# Patient Record
Sex: MALE | Race: WHITE
[De-identification: names, ages, dates, MRNs, and addresses within clinical notes are randomized per-mention and may not be internally consistent; named-entity substitution may affect disease eponyms.]

---

## 2018-08-21 ENCOUNTER — HOSPITAL ENCOUNTER (INPATIENT)
Dept: HOSPITAL 97 - ER | Age: 68
LOS: 3 days | Discharge: HOME | DRG: 683 | End: 2018-08-24
Attending: FAMILY MEDICINE | Admitting: INTERNAL MEDICINE
Payer: COMMERCIAL

## 2018-08-21 DIAGNOSIS — E44.0: ICD-10-CM

## 2018-08-21 DIAGNOSIS — E87.4: ICD-10-CM

## 2018-08-21 DIAGNOSIS — D72.829: ICD-10-CM

## 2018-08-21 DIAGNOSIS — E78.5: ICD-10-CM

## 2018-08-21 DIAGNOSIS — T39.315A: ICD-10-CM

## 2018-08-21 DIAGNOSIS — E83.42: ICD-10-CM

## 2018-08-21 DIAGNOSIS — I24.8: ICD-10-CM

## 2018-08-21 DIAGNOSIS — I35.0: ICD-10-CM

## 2018-08-21 DIAGNOSIS — M62.82: ICD-10-CM

## 2018-08-21 DIAGNOSIS — M19.90: ICD-10-CM

## 2018-08-21 DIAGNOSIS — E87.6: ICD-10-CM

## 2018-08-21 DIAGNOSIS — R01.1: ICD-10-CM

## 2018-08-21 DIAGNOSIS — B95.2: ICD-10-CM

## 2018-08-21 DIAGNOSIS — E83.39: ICD-10-CM

## 2018-08-21 DIAGNOSIS — Z88.0: ICD-10-CM

## 2018-08-21 DIAGNOSIS — Z79.1: ICD-10-CM

## 2018-08-21 DIAGNOSIS — Z88.2: ICD-10-CM

## 2018-08-21 DIAGNOSIS — N17.9: Primary | ICD-10-CM

## 2018-08-21 DIAGNOSIS — E66.01: ICD-10-CM

## 2018-08-21 DIAGNOSIS — N39.0: ICD-10-CM

## 2018-08-21 DIAGNOSIS — E86.0: ICD-10-CM

## 2018-08-21 LAB
ALBUMIN SERPL BCP-MCNC: 2.8 G/DL (ref 3.4–5)
ALP SERPL-CCNC: 145 U/L (ref 45–117)
ALT SERPL W P-5'-P-CCNC: 57 U/L (ref 12–78)
AST SERPL W P-5'-P-CCNC: 103 U/L (ref 15–37)
BUN BLD-MCNC: 63 MG/DL (ref 7–18)
CKMB CREATINE KINASE MB: 4.2 NG/ML (ref 0.3–3.6)
GLUCOSE SERPLBLD-MCNC: 151 MG/DL (ref 74–106)
HCT VFR BLD CALC: 36.7 % (ref 39.6–49)
INR BLD: 1.14
LIPASE SERPL-CCNC: 135 U/L (ref 73–393)
LYMPHOCYTES # SPEC AUTO: 1.9 K/UL (ref 0.7–4.9)
MAGNESIUM SERPL-MCNC: 2.1 MG/DL (ref 1.8–2.4)
MCH RBC QN AUTO: 30.2 PG (ref 27–35)
MCV RBC: 88.8 FL (ref 80–100)
NT-PROBNP SERPL-MCNC: 1230 PG/ML (ref ?–125)
PMV BLD: 9.1 FL (ref 7.6–11.3)
POTASSIUM SERPL-SCNC: 3.3 MMOL/L (ref 3.5–5.1)
RBC # BLD: 4.13 M/UL (ref 4.33–5.43)

## 2018-08-21 PROCEDURE — 36415 COLL VENOUS BLD VENIPUNCTURE: CPT

## 2018-08-21 PROCEDURE — 84443 ASSAY THYROID STIM HORMONE: CPT

## 2018-08-21 PROCEDURE — 80048 BASIC METABOLIC PNL TOTAL CA: CPT

## 2018-08-21 PROCEDURE — 82962 GLUCOSE BLOOD TEST: CPT

## 2018-08-21 PROCEDURE — 93306 TTE W/DOPPLER COMPLETE: CPT

## 2018-08-21 PROCEDURE — 84156 ASSAY OF PROTEIN URINE: CPT

## 2018-08-21 PROCEDURE — 83880 ASSAY OF NATRIURETIC PEPTIDE: CPT

## 2018-08-21 PROCEDURE — 93005 ELECTROCARDIOGRAM TRACING: CPT

## 2018-08-21 PROCEDURE — 96365 THER/PROPH/DIAG IV INF INIT: CPT

## 2018-08-21 PROCEDURE — 99285 EMERGENCY DEPT VISIT HI MDM: CPT

## 2018-08-21 PROCEDURE — 96361 HYDRATE IV INFUSION ADD-ON: CPT

## 2018-08-21 PROCEDURE — 81003 URINALYSIS AUTO W/O SCOPE: CPT

## 2018-08-21 PROCEDURE — 84145 PROCALCITONIN (PCT): CPT

## 2018-08-21 PROCEDURE — 83735 ASSAY OF MAGNESIUM: CPT

## 2018-08-21 PROCEDURE — 87086 URINE CULTURE/COLONY COUNT: CPT

## 2018-08-21 PROCEDURE — 83036 HEMOGLOBIN GLYCOSYLATED A1C: CPT

## 2018-08-21 PROCEDURE — 80069 RENAL FUNCTION PANEL: CPT

## 2018-08-21 PROCEDURE — 84132 ASSAY OF SERUM POTASSIUM: CPT

## 2018-08-21 PROCEDURE — 80076 HEPATIC FUNCTION PANEL: CPT

## 2018-08-21 PROCEDURE — 87088 URINE BACTERIA CULTURE: CPT

## 2018-08-21 PROCEDURE — 86430 RHEUMATOID FACTOR TEST QUAL: CPT

## 2018-08-21 PROCEDURE — 83605 ASSAY OF LACTIC ACID: CPT

## 2018-08-21 PROCEDURE — 84100 ASSAY OF PHOSPHORUS: CPT

## 2018-08-21 PROCEDURE — 74176 CT ABD & PELVIS W/O CONTRAST: CPT

## 2018-08-21 PROCEDURE — 86225 DNA ANTIBODY NATIVE: CPT

## 2018-08-21 PROCEDURE — 87040 BLOOD CULTURE FOR BACTERIA: CPT

## 2018-08-21 PROCEDURE — 82550 ASSAY OF CK (CPK): CPT

## 2018-08-21 PROCEDURE — 84484 ASSAY OF TROPONIN QUANT: CPT

## 2018-08-21 PROCEDURE — 85025 COMPLETE CBC W/AUTO DIFF WBC: CPT

## 2018-08-21 PROCEDURE — 82570 ASSAY OF URINE CREATININE: CPT

## 2018-08-21 PROCEDURE — 87186 SC STD MICRODIL/AGAR DIL: CPT

## 2018-08-21 PROCEDURE — 76377 3D RENDER W/INTRP POSTPROCES: CPT

## 2018-08-21 PROCEDURE — 96375 TX/PRO/DX INJ NEW DRUG ADDON: CPT

## 2018-08-21 PROCEDURE — 86235 NUCLEAR ANTIGEN ANTIBODY: CPT

## 2018-08-21 PROCEDURE — 85730 THROMBOPLASTIN TIME PARTIAL: CPT

## 2018-08-21 PROCEDURE — 81015 MICROSCOPIC EXAM OF URINE: CPT

## 2018-08-21 PROCEDURE — 80053 COMPREHEN METABOLIC PANEL: CPT

## 2018-08-21 PROCEDURE — 86038 ANTINUCLEAR ANTIBODIES: CPT

## 2018-08-21 PROCEDURE — 71046 X-RAY EXAM CHEST 2 VIEWS: CPT

## 2018-08-21 PROCEDURE — 86160 COMPLEMENT ANTIGEN: CPT

## 2018-08-21 PROCEDURE — 82805 BLOOD GASES W/O2 SATURATION: CPT

## 2018-08-21 PROCEDURE — 82553 CREATINE MB FRACTION: CPT

## 2018-08-21 PROCEDURE — 83690 ASSAY OF LIPASE: CPT

## 2018-08-21 PROCEDURE — 85610 PROTHROMBIN TIME: CPT

## 2018-08-21 PROCEDURE — 84550 ASSAY OF BLOOD/URIC ACID: CPT

## 2018-08-21 PROCEDURE — 71045 X-RAY EXAM CHEST 1 VIEW: CPT

## 2018-08-21 PROCEDURE — 86147 CARDIOLIPIN ANTIBODY EA IG: CPT

## 2018-08-21 PROCEDURE — 83970 ASSAY OF PARATHORMONE: CPT

## 2018-08-21 PROCEDURE — 76770 US EXAM ABDO BACK WALL COMP: CPT

## 2018-08-21 PROCEDURE — 87077 CULTURE AEROBIC IDENTIFY: CPT

## 2018-08-21 PROCEDURE — 96366 THER/PROPH/DIAG IV INF ADDON: CPT

## 2018-08-21 NOTE — ER
Nurse's Notes                                                                                     

 [facility]                                                                

Name: Ashok Simon                                                                             

Age: 68 yrs                                                                                       

Sex: Male                                                                                         

: 1950                                                                                   

MRN: [id number]                                                                                   

Arrival Date: 2018                                                                          

Time: 18:18                                                                                       

Account#: [id number]                                                                            

Bed 6                                                                                             

Private MD: Martir Raya T                                                                        

Diagnosis: Hypotension;Unspecified kidney failure;Hypokalemia;Rhabdomyolysis;Obesity,             

  unspecified;Elevated white blood cell count;Cystitis                                            

                                                                                                  

Presentation:                                                                                     

                                                                                             

18:36 Presenting complaint: Patient states: " I had some blood drawn earlier and they said I  ph  

      needed to come in because my WBC was high and my kidney function was bad." Pt reports       

      diarrhea on Friday, also reports sweating and chills, denies pain, states, " I took my      

      blood pressure this morning and it was normal and then when I took it again it was low      

      (90s/60s) and it hasn't gotten above 100 all day." Pt diaphoretic in triage. Transition     

      of care: patient was not received from another setting of care. Onset of symptoms was       

      2018. Risk Assessment: Do you want to hurt yourself or someone else? Patient     

      reports no desire to harm self or others. Initial Sepsis Screen: Does the patient meet      

      any 2 criteria? Systolic BP < 90 mmHg. HR > 90 bpm. Yes. Care prior to arrival: None.       

18:36 Method Of Arrival: Ambulatory                                                           ph  

18:36 Acuity: CATHY 3                                                                           ph  

21:19 Initial Sepsis Screen: Does the patient have a suspected source of infection? No.       ak1 

      Patient's initial sepsis screen is negative.                                                

                                                                                                  

Historical:                                                                                       

- Allergies:                                                                                      

18:42 PENICILLINS;                                                                            ph  

18:42 Sulfa (Sulfonamide Antibiotics);                                                        ph  

- Home Meds:                                                                                      

18:42 unknown BP med w/ diuretic [Active];                                                    ph  

- PMHx:                                                                                           

18:42 Hypertension;                                                                           ph  

- PSHx:                                                                                           

18:42 Cholecystectomy; Appendectomy;                                                          ph  

                                                                                                  

- Immunization history:: Adult Immunizations unknown.                                             

- Social history:: Smoking status: Patient/guardian denies using tobacco.                         

- Ebola Screening: : No symptoms or risks identified at this time.                                

- Family history:: not pertinent.                                                                 

                                                                                                  

                                                                                                  

Screenin:40 Abuse screen: Denies threats or abuse. Denies injuries from another. Nutritional        hb  

      screening: No deficits noted. Tuberculosis screening: No symptoms or risk factors           

      identified. Fall Risk Total Herron Fall Scale indicates Low Risk Score (25-44 pts). Fall     

      prevention measures have been instituted. Side Rails Up X 2 Frequent Obs/Assesments         

      occuring As available Patient and Family Educated on Fall Prevention Program and            

      strategies.                                                                                 

                                                                                                  

Assessment:                                                                                       

19:01 General: Appears in no apparent distress. ill, Behavior is calm, cooperative. Pain:     hb  

      Denies pain. Neuro: Level of Consciousness is awake, alert, obeys commands, Oriented to     

      person, place, time, situation. Cardiovascular: Heart tones S1 S2 present Capillary         

      refill < 3 seconds. Respiratory: Airway is patent Trachea midline Respiratory effort is     

      even, unlabored, Respiratory pattern is regular, symmetrical, Breath sounds are clear       

      bilaterally. GI: No signs and/or symptoms were reported involving the gastrointestinal      

      system. : No signs and/or symptoms were reported regarding the genitourinary system.      

      EENT: No signs and/or symptoms were reported regarding the EENT system. Derm: Skin is       

      intact, is healthy with good turgor, Skin is diaphoretic, Skin is pale.                     

      Musculoskeletal: No signs and/or symptoms reported regarding the musculoskeletal system.    

20:21 Reassessment: PT ABLE TO AMBULATE TO RESTROOM AND URINATE WITHOUT ISSUE, MD NOTIFIED.   bp  

      OLEARY CANCELLED BY MD. PRE VOID BLADDER SCAN REVEALED NO RETENTION, 150CC IN BLADDER.       

                                                                                                  

Vital Signs:                                                                                      

18:39 BP 98 / 64; Pulse 110; Resp 22; Temp 97.7(TE); Pulse Ox 97% on R/A; Weight 151.5 kg;    ph  

19:58                                                                                         ak1 

20:26 BP 97 / 48; Pulse 103; Resp 25; Pulse Ox 97% ;                                          bp  

21:18  / 56; Pulse 102; Resp 25; Temp 97.7(O); Pulse Ox 99% on R/A; Pain 0/10;          ak1 

19:58 150mL from bladder scan pre-void.                                                       ak1 

                                                                                                  

ED Course:                                                                                        

18:18 Patient arrived in ED.                                                                  mr  

18:18 Martir Raya MD is Private Physician.                                                   mr  

18:29 Gregorio Zeng MD is Attending Physician.                                             dawn 

18:36 Missed attempt(s): 20 gauge in right forearm. by Filiberto HUBER. Bleeding controlled, band   hb  

      aid applied, catheter tip intact.                                                           

18:39 Triage completed.                                                                       ph  

18:40 Patient has correct armband on for positive identification. Placed in gown. Bed in low  hb  

      position. Call light in reach. Side rails up X 1.                                           

18:40 Inserted saline lock: 22 gauge in left antecubital area, using aseptic technique. Blood hb  

      collected.                                                                                  

18:42 Arm band placed on Patient placed in an exam room, on a stretcher, in view of staff     ph  

      members, on cardiac monitor, on pulse oximetry.                                             

18:56 Prabha Teresa, RN is Primary Nurse.                                                     ak1 

18:56 X-ray completed. Portable x-ray completed in exam room. Patient tolerated procedure     mh1 

      well.                                                                                       

18:56 XRAY Chest (1 view) In Process Unspecified.                                             EDMS

19:02 CT Stone Protocol In Process Unspecified.                                               EDMS

19:42 Notified ED physician of a critical lab result(s). CR 5.4, CPK [id number] Dr Karri torres  

      notified.                                                                                   

19:56 Bladder scan completed. 150.                                                            mt  

20:22 Inserted saline lock: 20 gauge in right hand, using aseptic technique.                  bp  

20:25 Yung Garner MD is Hospitalizing Provider.                                          dawn 

21:16 No provider procedures requiring assistance completed. Patient admitted, IV remains in  ak1 

      place.                                                                                      

                                                                                                  

Administered Medications:                                                                         

18:45 Drug: NS 0.9% 500 ml Route: IV; Rate: bolus; Site: left antecubital;                    hb  

19:57 Follow up: IV Status: Completed infusion                                                ak1 

20:20 Drug: NS 0.9% 1000 ml Route: IV; Rate: 125 ml/hr; Site: right hand;                     bp  

22:02 Follow up: IV Status: Completed infusion                                                ak1 

20:20 Drug: Cefepime 2 grams Route: IVPB; Rate: 200 ml/hr; Infused Over: 30 mins; Site: right bp  

      hand;                                                                                       

21:58 Follow up: IV Status: Completed infusion                                                ak1 

20:25 Not Given (Duplicate Order): NS 0.9% 1000 ml IV at 125 ml/hr continuous                 bp  

20:55 Drug: Pepcid 20 mg Route: IVP; Site: right hand;                                        lp1 

21:57 Follow up: Response: No adverse reaction                                                ak1 

20:55 Drug: Aspirin 81 mg Route: PO;                                                          lp1 

21:57 Follow up: Response: No adverse reaction                                                ak1 

20:55 Drug: Potassium Chloride 20 mEq Route: PO;                                              lp1 

21:56 Follow up: Response: No adverse reaction                                                ak1 

                                                                                                  

                                                                                                  

Outcome:                                                                                          

20:30 Decision to Hospitalize by Provider.                                                    dawn 

21:17 Condition: stable                                                                       ak1 

21:17 Instructed on the need for admit.                                                           

22:03 Admitted to Med/surg accompanied by tech, via wheelchair, room 428, with chart, Report  ak1 

      called to  Zulema                                                                            

22:13 Patient left the ED.                                                                    ak1 

                                                                                                  

Signatures:                                                                                       

Dispatcher [employer]                           EDMS                                                 

Gregorio Zeng MD MD cha Rivera, Maria                                Anais Sage                               1                                                  

Faviola Cortés, RN                     RN   bb                                                   

An Rose RN                         RN   1                                                  

Prabha Teresa RN RN   ak1                                                  

Anitha Baugh, Alice Grant RN, ph, Ana Cristina Ghotra RN, mt, Brian RN                      RN   bp                                                   

                                                                                                  

**************************************************************************************************

## 2018-08-21 NOTE — EDPHYS
Physician Documentation                                                                           

 [facility]                                                                

Name: Ashok Simon                                                                             

Age: 68 yrs                                                                                       

Sex: Male                                                                                         

: 1950                                                                                   

MRN: [id number]                                                                                   

Arrival Date: 2018                                                                          

Time: 18:18                                                                                       

Account#: [id number]                                                                            

Bed 6                                                                                             

Private MD: Martir Raya T                                                                        

ED Physician Gregorio Zeng                                                                      

HPI:                                                                                              

                                                                                             

18:54 This 68 yrs old  Male presents to ER via Ambulatory with complaints of         dawn 

      Abnormal Lab Results.                                                                       

18:54 The patient has shortness of breath at rest, with light activity. Onset: The            dawn 

      symptoms/episode began/occurred 3 day(s) ago. Duration: The symptoms are continuous,        

      and are steadily getting worse. The patient's shortness of breath has no apparent           

      modifying factors. weak, tired, decreased urine output. The patient has experienced         

      near-syncope, almost passed out, felt dizzy, felt faint, felt generally weak. Duration:     

      The patient has had multiple episodes. Associated signs and symptoms: Pertinent             

      positives: non-productive cough, dizziness, fever, nausea. Severity of symptoms: At         

      their worst the symptoms were moderate in the emergency department the symptoms are         

      unchanged.                                                                                  

                                                                                                  

Historical:                                                                                       

- Allergies:                                                                                      

18:42 PENICILLINS;                                                                            ph  

18:42 Sulfa (Sulfonamide Antibiotics);                                                        ph  

- Home Meds:                                                                                      

18:42 unknown BP med w/ diuretic [Active];                                                    ph  

- PMHx:                                                                                           

18:42 Hypertension;                                                                           ph  

- PSHx:                                                                                           

18:42 Cholecystectomy; Appendectomy;                                                          ph  

                                                                                                  

- Immunization history:: Adult Immunizations unknown.                                             

- Social history:: Smoking status: Patient/guardian denies using tobacco.                         

- Ebola Screening: : No symptoms or risks identified at this time.                                

- Family history:: not pertinent.                                                                 

                                                                                                  

                                                                                                  

ROS:                                                                                              

18:54 Constitutional: Negative for fever, chills, and weight loss, Eyes: Negative for injury, dawn 

      pain, redness, and discharge, ENT: Negative for injury, pain, and discharge, Neck:          

      Negative for injury, pain, and swelling, Cardiovascular: Negative for chest pain,           

      palpitations, and edema, Back: Negative for injury and pain, MS/Extremity: Negative for     

      injury and deformity, Skin: Negative for injury, rash, and discoloration, Neuro:            

      Negative for headache, weakness, numbness, tingling, and seizure, Psych: Negative for       

      depression, anxiety, suicide ideation, homicidal ideation, and hallucinations,              

      Allergy/Immunology: Negative for hives, rash, and allergies, Endocrine: Negative for        

      neck swelling, polydipsia, polyuria, polyphagia, and marked weight changes,                 

      Hematologic/Lymphatic: Negative for swollen nodes, abnormal bleeding, and unusual           

      bruising.                                                                                   

18:54 Respiratory: Positive for cough.                                                            

18:54 Abdomen/GI: Positive for nausea, abdominal distension.                                      

                                                                                                  

Exam:                                                                                             

18:54 Abdomen/GI:                                                                             dawn 

18:54 Constitutional:  This is a well developed, well nourished patient who is awake, alert,      

      and in no acute distress. Head/Face:  Normocephalic, atraumatic. Eyes:  Pupils equal        

      round and reactive to light, extra-ocular motions intact.  Lids and lashes normal.          

      Conjunctiva and sclera are non-icteric and not injected.  Cornea within normal limits.      

      Periorbital areas with no swelling, redness, or edema. ENT:  Nares patent. No nasal         

      discharge, no septal abnormalities noted.  Tympanic membranes are normal and external       

      auditory canals are clear.  Oropharynx with no redness, swelling, or masses, exudates,      

      or evidence of obstruction, uvula midline.  Mucous membranes moist. Neck:  Trachea          

      midline, no thyromegaly or masses palpated, and no cervical lymphadenopathy.  Supple,       

      full range of motion without nuchal rigidity, or vertebral point tenderness.  No            

      Meningismus. Chest/axilla:  Normal chest wall appearance and motion.  Nontender with no     

      deformity.  No lesions are appreciated. Back:  No spinal tenderness.  No costovertebral     

      tenderness.  Full range of motion. Male :  Normal genitalia with no discharge or          

      lesions. Skin:  Warm, dry with normal turgor.  Normal color with no rashes, no lesions,     

      and no evidence of cellulitis. MS/ Extremity:  Pulses equal, no cyanosis.                   

      Neurovascular intact.  Full, normal range of motion. Neuro:  Awake and alert, GCS 15,       

      oriented to person, place, time, and situation.  Cranial nerves II-XII grossly intact.      

      Motor strength 5/5 in all extremities.  Sensory grossly intact.  Cerebellar exam            

      normal.  Normal gait. Psych:  Awake, alert, with orientation to person, place and time.     

       Behavior, mood, and affect are within normal limits.                                       

18:54 Cardiovascular: Rate: tachycardic, Rhythm: regular, Pulses: Pulses are 4+ in bilateral      

      radial, brachial, femoral, popliteal, posterior tibial and and dorsalis pedis               

      arteries.. Heart sounds: normal, Edema: is not appreciated, JVD: is not appreciated.        

18:54 Respiratory: mild respiratory distress is noted,  Respirations: normal, Breath sounds:      

      decreased breath sounds, that are mild, are scattered, rhonchi.                             

18:54 Musculoskeletal/extremity: Circulation is intact in all extremities. Sensation intact.  [facility] 

      Compartment Syndrome exam of affected extremity: is normal. DVT Exam: No signs of deep      

      vein thrombosis. no pain, no swelling, no tenderness, negative Homans' sign noted on        

      exam, no appreciated bluish discoloration, no erythema, no increased warmth.                

                                                                                                  

Vital Signs:                                                                                      

18:39 BP 98 / 64; Pulse 110; Resp 22; Temp 97.7(TE); Pulse Ox 97% on R/A; Weight 151.5 kg;    ph  

19:58                                                                                         ak1 

20:26 BP 97 / 48; Pulse 103; Resp 25; Pulse Ox 97% ;                                          bp  

21:18  / 56; Pulse 102; Resp 25; Temp 97.7(O); Pulse Ox 99% on R/A; Pain 0/10;          ak1 

19:58 150mL from bladder scan pre-void.                                                       ak1 

                                                                                                  

MDM:                                                                                              

18:29 Patient medically screened.                                                             [facility] 

19:00 Data reviewed: vital signs, nurses notes, lab test result(s), EKG, radiologic studies,  [facility] 

      CT scan, plain films.                                                                       

                                                                                                  

                                                                                             

18:47 Order name: Basic Metabolic Panel; Complete Time: 20:20                                 [facility] 

                                                                                             

18:47 Order name: CBC with Diff; Complete Time: 20:20                                         [facility] 

                                                                                             

18:47 Order name: Ckmb; Complete Time: 20:20                                                  [facility] 

                                                                                             

18:47 Order name: CPK; Complete Time: 20:20                                                   [facility] 

                                                                                             

18:47 Order name: LFT's; Complete Time: 20:20                                                 [facility] 

                                                                                             

18:47 Order name: Magnesium; Complete Time: 20:20                                             [facility] 

                                                                                             

18:47 Order name: NT PRO-BNP; Complete Time: 20:20                                            [facility] 

                                                                                             

18:47 Order name: PT-INR; Complete Time: 20:20                                                [facility] 

                                                                                             

18:47 Order name: Ptt, Activated; Complete Time: 20:20                                        [facility] 

                                                                                             

18:47 Order name: Troponin (emerg Dept Use Only); Complete Time: 20:20                        [facility] 

                                                                                             

18:47 Order name: Blood Culture Adult (2)                                                     [facility] 

                                                                                             

18:47 Order name: Urine Culture                                                               [facility] 

                                                                                             

18:47 Order name: Lipase; Complete Time: 20:20                                                [facility] 

                                                                                             

18:47 Order name: Lactate; Complete Time: 20:20                                               [facility] 

                                                                                             

18:47 Order name: XRAY Chest (1 view); Complete Time: 20:20                                   [facility] 

                                                                                             

18:47 Order name: EKG; Complete Time: 18:47                                                   [facility] 

                                                                                             

18:47 Order name: Cardiac monitoring; Complete Time: 18:57                                    [facility] 

                                                                                             

18:47 Order name: EKG - Nurse/Tech; Complete Time: 19:16                                      [facility] 

                                                                                             

18:47 Order name: CT Stone Protocol; Complete Time: 20:20                                     [facility] 

                                                                                             

18:47 Order name: Procalcitonin; Complete Time: 20:20                                         [facility] 

                                                                                             

20:30 Order name: TSH                                                                         [facility] 

                                                                                             

20:38 Order name: Urine Dipstick--Ancillary (enter results)                                   ms  

                                                                                             

20:40 Order name: CONS Physician Consult                                                      EDMS

                                                                                             

18:47 Order name: IV Saline Lock; Complete Time: 18:58                                        [facility] 

                                                                                             

18:47 Order name: Labs collected and sent; Complete Time: 18:57                               [facility] 

                                                                                             

18:47 Order name: O2 Per Protocol; Complete Time: 18:57                                       [facility] 

                                                                                             

18:47 Order name: O2 Sat Monitoring; Complete Time: 18:57                                     [facility] 

                                                                                             

18:47 Order name: Urine Dipstick-Ancillary (obtain specimen); Complete Time: 20:25            [facility] 

                                                                                             

18:47 Order name: Bladder Scanner; Complete Time: 19:55                                       [facility] 

                                                                                             

19:09 Order name: Guerrero: viscus lido; Complete Time: 20:08                                    [facility] 

                                                                                                  

Administered Medications:                                                                         

18:45 Drug: NS 0.9% 500 ml Route: IV; Rate: bolus; Site: left antecubital;                    hb  

19:57 Follow up: IV Status: Completed infusion                                                ak1 

20:20 Drug: NS 0.9% 1000 ml Route: IV; Rate: 125 ml/hr; Site: right hand;                     bp  

22:02 Follow up: IV Status: Completed infusion                                                ak1 

20:20 Drug: Cefepime 2 grams Route: IVPB; Rate: 200 ml/hr; Infused Over: 30 mins; Site: right bp  

      hand;                                                                                       

21:58 Follow up: IV Status: Completed infusion                                                ak1 

20:25 Not Given (Duplicate Order): NS 0.9% 1000 ml IV at 125 ml/hr continuous                 bp  

20:55 Drug: Pepcid 20 mg Route: IVP; Site: right hand;                                        lp1 

21:57 Follow up: Response: No adverse reaction                                                ak1 

20:55 Drug: Aspirin 81 mg Route: PO;                                                          lp1 

21:57 Follow up: Response: No adverse reaction                                                ak1 

20:55 Drug: Potassium Chloride 20 mEq Route: PO;                                              lp1 

21:56 Follow up: Response: No adverse reaction                                                ak1 

                                                                                                  

                                                                                                  

Disposition:                                                                                      

18 20:30 Hospitalization ordered by Yung Garner for Inpatient Admission. Preliminary    

  diagnosis are Hypotension, Unspecified kidney failure, Hypokalemia,                             

  Rhabdomyolysis, Obesity, unspecified, Elevated white blood cell count,                          

  Cystitis.                                                                                       

- Bed requested for Telemetry/MedSurg (Inpatient).                                                

- Status is Inpatient Admission.                                                              ak1 

- Condition is Fair.                                                                              

- Problem is new.                                                                                 

- Symptoms have improved.                                                                         

UTI on Admission? Yes                                                                             

                                                                                                  

                                                                                                  

                                                                                                  

Signatures:                                                                                       

Dispatcher [employer]                           EDMS                                                 

Gregorio Zeng MD MD cha Pena, Laura, RN                         RN   lp1                                                  

Prabha Teresa RN                       RN   ak1                                                  

Anitha Baugh RN RN                                                      

Kaylynn Brown RN RN                                                      

Alice Don RN                     MAYO                                                      

Noel Chavez RN                      RN   bp                                                   

                                                                                                  

Corrections: (The following items were deleted from the chart)                                    

20:56 20:30 Hospitalization Ordered by Yung Garner MD for Inpatient Admission. Preliminary cg  

      diagnosis is Hypotension; Unspecified kidney failure; Hypokalemia; Rhabdomyolysis;          

      Obesity, unspecified; Elevated white blood cell count; Cystitis. Bed requested for          

      Telemetry/MedSurg (Inpatient). Status is Inpatient Admission. Condition is Fair.            

      Problem is new. Symptoms have improved. UTI on Admission? Yes. dawn                          

22:13 20:56 2018 20:30 Hospitalization Ordered by Yung Garner MD for Inpatient       ak1 

      Admission. Preliminary diagnosis is Hypotension; Unspecified kidney failure;                

      Hypokalemia; Rhabdomyolysis; Obesity, unspecified; Elevated white blood cell count;         

      Cystitis. Bed requested for Telemetry/MedSurg (Inpatient). Status is Inpatient              

      Admission. Condition is Fair. Problem is new. Symptoms have improved. UTI on Admission?     

      Yes. cg                                                                                     

                                                                                                  

**************************************************************************************************

## 2018-08-21 NOTE — RAD REPORT
EXAM DESCRIPTION:  CT - Stone Protocol - 8/21/2018 7:01 pm

 

CLINICAL HISTORY:  Abdominal pain, abdominal distention

 

COMPARISON:  None.

 

TECHNIQUE:  Axial 5 mm thick images were obtained without oral or IV contrast. The field-of-view span
s the entirety of the  system partially obscuring uppermost abdomen and lung bases.

 

All CT scans are performed using dose optimization technique as appropriate and may include automated
 exposure control or mA/KV adjustment according to patient size.

 

FINDINGS:  No hydronephrosis is present and no obstructing ureteral calculi. No suspicious renal mass
es. Isodense masses and pyelonephritis are not excluded on a stone protocol CT scan. Bilateral perine
phric stranding is present. Right kidney is incompletely rotated. Urinary bladder is mostly contracte
d. Superiorly directed spurring from the right-side pubic symphysis has mild mass effect on the bladd
er base. No bladder calculus. Prostate gland and seminal vesicles within normal range.

 

Imaged portions of the liver, spleen and pancreas show no suspicious findings on non-contrast imaging
. Cholecystectomy changes are present. No biliary tree dilatation. No significant adrenal finding.

 

No suspicious bowel findings.

 

No mass or bulky lymphadenopathy. Bilateral fat filled inguinal hernias are present with no acute com
ponents. A very small fat only umbilical hernia is present. No free air, free fluid or inflammatory s
tranding.

 

Disc and bony degenerative changes are present. No destructive bone process.

 

IMPRESSION:  No hydronephrosis, obstructing calculus or acute  finding seen.

 

Isodense masses and pyelonephritis are not excluded on stone protocol technique.

 

No acute GI process. Full findings are detailed in the body of the report.

## 2018-08-21 NOTE — RAD REPORT
EXAM DESCRIPTION:  RAD - Chest Single View - 8/21/2018 6:58 pm

 

CLINICAL HISTORY:  Cough, shortness of breath

 

COMPARISON:  August 21, 2018

 

TECHNIQUE:  AP portable chest image was obtained 1846 hours .

 

FINDINGS:  Continued mild prominence of the lung base markings. No definitive infiltrate. No consolid
ation, mass or failure finding. Heart and vasculature are normal. No measurable pleural effusion and 
no pneumothorax. No gross bony abnormality seen. No acute aortic findings suspected.

 

IMPRESSION:  No acute cardiopulmonary process.

 

No new finding from earlier study.

## 2018-08-21 NOTE — P.HP
Certification for Inpatient


Patient admitted to: Inpatient


With expected LOS: >2 Midnights


Practitioner: I am a practitioner with admitting privileges, knowledge of 

patient current condition, hospital course, and medical plan of care.


Services: Services provided to patient in accordance with Admission 

requirements found in Title 42 Section 412.3 of the Code of Federal Regulations





Patient History


Date of Service: 08/21/18


Reason for admission: Acute kidney injury


History of Present Illness: 





Mr Simon is a 68-year-old male with history of morbid obesity and hypertension

, who came to the hospital referred by his PCP due to abnormal lab work.  He 

states that last Friday, has had a busy day at work, walking outside in the hot 

temperature, and he was not able to catch up with water.  Since so, he has 

start to feel progressively weak and dizzy.  He had some diarrhea episode.  He 

denied any vomiting, but he stated that the food did not taste normal for him.  

He denied any fever, but has has chills and sweating episode.  No abdominal pain

, shortness of breath or chest pain. His blood pressure was on the lower side 

at home, 100's/ 60s.  Yesterday he went to see his PCP, order lab work.  The 

patient was called by the PCP office today, referring to the hospital due to 

significantly abnormal kidney function, creatinine in was 5.4, WBC 21.6, total 

CK 3714, elevated troponin I and procalcitonin.  Lactate is within normal 

limits.  Vital signs on arrival shows normal temperature, BP 98/64, .


Allergies





Penicillins Allergy (Verified 08/21/18 21:15)


 Rash


Sulfa (Sulfonamide Antibiotics) Allergy (Verified 08/21/18 21:15)


 Rash








- Past Medical/Surgical History


-: Morbid obesity


-: Hypertension


-: Cholecystectomy


-: Appendectomy





- Family History


Family History: Reviewed- Non-Contributory





- Social History


Smoking Status: Never smoker


Alcohol use: Yes


CD- Drugs: No


Caffeine use: Yes


Place of Residence: Home





Review of Systems


10-point ROS is otherwise unremarkable





Physical Examination





- Physical Exam


General: Alert, In no apparent distress


HEENT: Atraumatic, PERRLA, Mucous membr. moist/pink, EOMI, Sclerae nonicteric


Neck: Supple, 2+ carotid pulse no bruit, No LAD, Without JVD or thyroid 

abnormality


Respiratory: Clear to auscultation bilaterally, Normal air movement


Cardiovascular: Regular rate/rhythm, Normal S1 S2, Systolic murmur (Aortic area 

radiating to bilateral neck)


Gastrointestinal: Normal bowel sounds, No tenderness


Musculoskeletal: No tenderness


Integumentary: No rashes


Neurological: Normal speech, Normal strength at 5/5 x4 extr, Normal tone, 

Normal affect


Lymphatics: No axilla or inguinal lymphadenopathy





- Studies


Laboratory Data (last 24 hrs)





08/21/18 18:30: PT 13.5 H, INR 1.14, APTT 25.5


08/21/18 18:30: WBC 21.6 H*, Hgb 12.5 L, Hct 36.7 L, Plt Count 247


08/21/18 18:30: Sodium 133 L, Potassium 3.3 L, BUN 63 H, Creatinine 5.40 H*, 

Glucose 151 H, Magnesium 2.1, Total Bilirubin 0.4,  H, ALT 57, Alkaline 

Phosphatase 145 H, Lipase 135








Assessment and Plan





- Problems (Diagnosis)


(1) Acute renal injury


Current Visit: Yes   Status: Acute   





(2) Rhabdomyolysis


Current Visit: Yes   Status: Acute   


Qualifiers: 


   Rhabdomyolysis type: non-traumatic   Qualified Code(s): M62.82 - 

Rhabdomyolysis   





(3) Dehydration


Current Visit: Yes   Status: Acute   





(4) Morbid obesity


Current Visit: Yes   Status: Acute   





(5) Leukocytosis


Current Visit: Yes   Status: Acute   


Qualifiers: 


   Leukocytosis type: unspecified   Qualified Code(s): D72.829 - Elevated white 

blood cell count, unspecified   





(6) Systolic murmur


Current Visit: Yes   Status: Acute   





- Plan








1. rhabdomyolysis:  Likely due to severe volume depletion and possible heat 

exhaustion.  Will continue with aggressive volume resuscitation. 


2. Acute renal injury:  Due to volume depletion, continue IV fluids.  

Nephrologist has been consulted.


3. Leukocytosis:  This is most likely secondary to dehydration, due to his 

clinical history and presentation.  In any case within continue with sepsis 

workup, procalcitonin is elevated, blood cultures are in process, continue 

empiric Rocephin IV.


4. Hypertension:  At this point his BP is on the lower side with due to volume 

depletion.  Continue IV fluids.


5. Systolic murmur:  Will order an echocardiogram.





- Advance Directives


Does patient have a Living Will: No


Does patient have a Durable POA for Healthcare: No





- Code Status/Comfort Care


Code Status Assessed: Yes


Code Status: Full Code

## 2018-08-22 LAB
BUN BLD-MCNC: 71 MG/DL (ref 7–18)
COHGB MFR BLDA: 1.1 % (ref 0–1.5)
CREAT UR-SCNC: 239 MG/DL (ref 20–370)
GLUCOSE SERPLBLD-MCNC: 138 MG/DL (ref 74–106)
HCT VFR BLD CALC: 33.3 % (ref 39.6–49)
LYMPHOCYTES # SPEC AUTO: 1.6 K/UL (ref 0.7–4.9)
MAGNESIUM SERPL-MCNC: 1.9 MG/DL (ref 1.8–2.4)
MCH RBC QN AUTO: 30.6 PG (ref 27–35)
MCV RBC: 89.4 FL (ref 80–100)
OXYHGB MFR BLDA: 94.3 % (ref 94–97)
PMV BLD: 9.5 FL (ref 7.6–11.3)
POTASSIUM SERPL-SCNC: 3.2 MMOL/L (ref 3.5–5.1)
PROT UR-MCNC: 134 MG/DL (ref ?–11.9)
RBC # BLD: 3.73 M/UL (ref 4.33–5.43)
SAO2 % BLDA: 96.1 % (ref 92–98.5)
TSH SERPL DL<=0.05 MIU/L-ACNC: 0.28 UIU/ML (ref 0.36–3.74)
URATE SERPL-MCNC: 11.3 MG/DL (ref 3.5–7.2)

## 2018-08-22 RX ADMIN — SODIUM CHLORIDE SCH MLS: 0.9 INJECTION, SOLUTION INTRAVENOUS at 03:10

## 2018-08-22 RX ADMIN — ENOXAPARIN SODIUM SCH MG: 30 INJECTION SUBCUTANEOUS at 18:39

## 2018-08-22 RX ADMIN — SODIUM CHLORIDE SCH MLS: 0.9 INJECTION, SOLUTION INTRAVENOUS at 10:26

## 2018-08-22 RX ADMIN — Medication SCH: at 09:00

## 2018-08-22 RX ADMIN — Medication SCH ML: at 21:46

## 2018-08-22 RX ADMIN — ACETAMINOPHEN PRN MG: 500 TABLET, FILM COATED ORAL at 18:53

## 2018-08-22 RX ADMIN — SODIUM CHLORIDE SCH MLS: 0.45 INJECTION, SOLUTION INTRAVENOUS at 21:44

## 2018-08-22 RX ADMIN — SODIUM CHLORIDE SCH MLS: 0.45 INJECTION, SOLUTION INTRAVENOUS at 12:17

## 2018-08-22 NOTE — CON
Date of Consultation:  08/22/2018



Consulting Physician:  Dr. Deleon.



Reason For Consultation:  Elevated BUN, creatinine, rhabdomyolysis, hypertension.



History Of Present Illness:  This is a pleasant 68-year-old gentleman with significant past medical h
istory of morbid obesity, hypertension, hyperlipidemia, osteoarthritis.  The patient was in his [facility] state of health.  According to him for the last few days, he working hard without real rest.  The 
patient found to have weakness and dizziness with episode of multiple diarrhea, for that reason, he r
eported to his primary.  In the primary office, CK was elevated to 3700 and creatinine was up to 5.4,
 for that reason, the patient was admitted in the ER, was hypotensive, systolic down to 90s.  The [facility]
ient admits that for the last almost couple of months, he using Advil 3-6 tablets in a daily basis. 



The patient has hesitancy, no hematuria or foamy urine.  No other autoimmune symptoms.



Past Medical History:  Include,

1.Obesity.

2.Hypertension.

3.Hyperlipidemia.

4.Osteoarthritis, on Advil.



Past Surgical History:  Include cholecystectomy, appendectomy.



Family History:  Positive for hypertension.



Social History:  Denies smoking, denies drinking, denies drug abuse.



Review of Systems:

Head and Neck:  No red eye.  No ear pain. 

GI:  No nausea, no vomiting. 

:  No polyuria.  No dysuria.  No hematuria.  Has hesitancy. 

GYN:  Not applicable. 

Respiratory:  No shortness of breath. 

Cardiovascular:  No chest pain. 

Neurologic:  Has multiple joint pain. 

Musculoskeletal:  Multiple joint pain and low back pain. 

Endocrine:  No polydipsia. 

Skin:  No rash.



Physical Examination:

Vital Signs:  When I saw the patient, blood pressure 104/59, pulse of 80, afebrile.  The patient was 
on IV fluid.  Voiding well. 

Chest:  Clear to auscultation. 

Heart:  S1, S2.  Regular. 

Abdomen:  Morbidly obese.  Could not appreciate any organomegaly or renal bruit. 

Extremities:  Plus edema. 

Vascular Exam:  No carotid bruit. 

Skin:  No rashes. 

Neurologic:  Alert and oriented x3.  Nonfocal.  No tremor.



Laboratory Data:  WBC 18.7, H and H 11.4/33.3, platelets 205.  On admission, H and H 12.5/36.7, eosin
ophil 200 on the peripheral.  Sodium 135, potassium 3.2, bicarb 23, BUN 71, creatinine 4.9, GFR of 12
, uric acid 11.3, calcium 7.5, magnesium 1.9.  CK 3900.  Cardiac enzyme was negative.  TSH 0.2.  Urin
alysis, +2 protein.  Specific gravity above 1.030.  Renal ultrasound has normal size to a large size 
kidney with severe disproportion in the kidney size, right kidney 13, left kidney of 16.  No hydronep
hrosis.  Has some echogenicity.



Medications:  Current medications in the hospital include, Lovenox, Tylenol, Zofran.  Normal saline a
t 150 per hour.  KCl.



Assessment And Plan:  Acute kidney injury, multifactorial, secondary to,

1.Rhabdomyolysis.

2.Nonsteroidal use.

3.Dehydration and poor perfusion acute tubular necrosis.

4.Superimposed with ARB and diuresis with hydrochlorothiazide.  The patient nonoliguric.  No hyperka
lemia or acidosis.  Given the component of rhabdomyolysis even though that he does not have any acido
sis, I am going to start the patient on bicarb drip, and we will monitor the patient.  I agree with h
olding hydrochlorothiazide and ARB for the time being and nonsteroidal, we will monitor.

5.Given the disproportion in the kidney size, renal artery stenosis has a possibility.  I am going t
o be reluctant to do any angiogram for the time being.  I will keep eye on it.  If kidney function di
d not improve that is another possibility that we need to be rule it out down in the row with renal s
can or MRA if needed.

6.Given the rhabdomyolysis and without any nonsteroidal, I am going to send for TSH to evaluate.

7.Rhabdomyolysis, will start bicarb drip.  We will aggressively replace potassium and magnesium and 
we will follow up.

8.Hypertension, with the presence of acute kidney injury.  Hold hydrochlorothiazide and ARB, and we 
will monitor.  Currently blood pressure controlled.  He was low blood pressure on presentation, so we
 will hold all blood pressure medication.

9.Hypokalemia, hypomagnesemia.  We will supplement aggressively given the rhabdomyolysis.

10.Contraction alkalosis with metabolic acidosis secondary to renal failure as above.

11.Marginal hyperkalemia.  We will continue hydration. 

Case discussed with the patient, verbalized understanding.





MA/ALICIA

DD:  08/22/2018 11:40:08Voice ID:  [id number]

DT:  08/22/2018 15:43:26Report ID:  [id number]

## 2018-08-22 NOTE — RAD REPORT
EXAM DESCRIPTION:  US - Renal Ultrasound-Complete - 8/22/2018 9:26 am

 

CLINICAL HISTORY:  Acute renal failure

 

COMPARISON:  August 21

 

FINDINGS:  The right kidney measures 13.0 x 6.4 x 6.2 cm.  The left kidney measures 16.0 x 6.6 x 6.3 
cm. Renal cortical thickness is normal. There is a slight increase in cortical echogenicity which cou
ld be a body habitus artifact or mild medical renal disease. No hydronephrosis or suspicious renal ma
ss.

 

Bladder is mostly contracted. No gross abnormality seen.

 

 

IMPRESSION:  No hydronephrosis or suspicious renal mass.

 

Slight increase in cortical echogenicity could be medical renal disease or body habitus artifact.

## 2018-08-22 NOTE — ECHO
HEIGHT: 6 ft 0 in   WEIGHT: 333 lb 14.4 oz   DATE OF STUDY: 08/22/2018   REFER DR: 
Yung Mcginnis MD

2-DIMENSIONAL: YES

     M.MODE: YES

 DOPPLER: YES

COLOR FLOW: YES



                    TDS:  NO

PORTABLE: NO

 DEFINITY:  NO

BUBBLE STUDY: NO





DIAGNOSIS:  SYSTOLIC MURMUR



CARDIAC HISTORY:  

CATHERIZATION: NO

SURGERY: NO

PROSTHETIC VALVE: NO

PACEMAKER: NO





MEASUREMENTS (cm)

    DIASTOLIC (NORMALS)                 SYSTOLIC (NORMALS)

IVSd                 1.4 (0.6-1.2)                    LA Diam 5.0 (1.9-4.0)     LVEF       
  76%  

LVIDd               4.5 (3.5-5.7)                        LVIDs      2.5 (2.0-3.5)     %FS  
        45%

LVPWd             1.3 (0.6-1.2)

Ao Diam           2.4 (2.0-3.7)



2 DIMENSIONAL ASSESSMENT:

RIGHT ATRIUM:                   NORMAL

LEFT ATRIUM:       DILATED



RIGHT VENTRICLE:            NORMAL

LEFT VENTRICLE: LEFT VENTRICULAR HYPERTROPHY



TRICUSPID VALVE:             NORMAL

MITRAL VALVE:     NORMAL



PULMONIC VALVE:             NORMAL

AORTIC VALVE:     NORMAL



PERICARDIAL EFFUSION: NONE

AORTIC ROOT:      NORMLA





LEFT VENTRICULAR WALL MOTION:     NORMAL



DOPPLER/COLOR FLOW:     MODERATE AORTIC STENOSIS.



COMMENTS:      MODERATE AORTIC STENOSIS. AORTIC VALVE AREA 1.1 CENTIMETERS SQUARED. NORMAL 
LEFT VENTRICULAR EJECTION FRACTION AND FUNCTION. LEFT VENTRICULAR HYPERTROPHY. LEFT ATRIAL 
ENLARGEMENT. NO EFFUSION.



TECHNOLOGIST:   ORTIZ MC

## 2018-08-22 NOTE — EKG
Test Date:    2018-08-21               Test Time:    19:10:55

Technician:   MT                                     

                                                     

MEASUREMENT RESULTS:                                       

Intervals:                                           

Rate:         95                                     

MI:           162                                    

QRSD:         94                                     

QT:           332                                    

QTc:          417                                    

Axis:                                                

P:            59                                     

MI:           162                                    

QRS:          66                                     

T:            65                                     

                                                     

INTERPRETIVE STATEMENTS:                                       

                                                     

Sinus rhythm with premature atrial complexes

Otherwise normal ECG

No previous ECG available for comparison



Electronically Signed On 08-22-18 07:19:47 CDT by Malcolm Chance

## 2018-08-23 LAB
ALBUMIN SERPL BCP-MCNC: 2.1 G/DL (ref 3.4–5)
BUN BLD-MCNC: 64 MG/DL (ref 7–18)
GLUCOSE SERPLBLD-MCNC: 130 MG/DL (ref 74–106)
MAGNESIUM SERPL-MCNC: 2.2 MG/DL (ref 1.8–2.4)
POTASSIUM SERPL-SCNC: 3 MMOL/L (ref 3.5–5.1)

## 2018-08-23 RX ADMIN — SODIUM CHLORIDE SCH: 0.45 INJECTION, SOLUTION INTRAVENOUS at 02:20

## 2018-08-23 RX ADMIN — SODIUM CHLORIDE SCH MLS: 0.45 INJECTION, SOLUTION INTRAVENOUS at 05:34

## 2018-08-23 RX ADMIN — ENOXAPARIN SODIUM SCH MG: 30 INJECTION SUBCUTANEOUS at 18:36

## 2018-08-23 RX ADMIN — Medication SCH ML: at 09:15

## 2018-08-23 RX ADMIN — ACETAMINOPHEN PRN MG: 500 TABLET, FILM COATED ORAL at 09:18

## 2018-08-23 RX ADMIN — Medication SCH: at 21:00

## 2018-08-23 RX ADMIN — SODIUM CHLORIDE SCH: 0.45 INJECTION, SOLUTION INTRAVENOUS at 16:40

## 2018-08-23 RX ADMIN — SODIUM CHLORIDE SCH MLS: 0.45 INJECTION, SOLUTION INTRAVENOUS at 18:35

## 2018-08-23 NOTE — PN
Date of Progress Note:  08/23/2018



Code status: Full



Subjective:  The patient seen and examined.  Chart reviewed and case discussed 
with RN.  The patient states he is feeling better.  He wanted to move around 
and ambulate.  He does report some constipation.



Review of Systems:

Negative except as above.



Medications:  List reviewed.



Physical Examination:

Vital Signs:  Temperature 97.6, heart rate 79, blood pressure 139/64, 
respirations 17, O2 saturation 97% on room air. 

General:  Awake, alert, oriented x3, not in any acute distress.  Elderly male, 
somewhat ill-appearing, obese, BMI of 48.2. 

CV:  S1, S2.  No murmurs.  Peripheral pulses present. 

Respiratory:  Moving air well bilaterally.  No wheezing. 

Gastrointestinal:  Abdomen is soft, nontender, nondistended.  Positive bowel 
sounds. 

Extremities:  No clubbing, cyanosis.  The patient does have 1+ peripheral 
edema. 

Neurologic:  Nonfocal.



Laboratory Data:  Sodium 135, potassium 3, chloride 99, CO2 of 28, BUN 64, 
creatinine 2.7, glucose 130, calcium 7, phosphorus 2.2, magnesium 2.2.  CK is 
3039, troponin 0.19, 0.12.  Urine culture growing Enterococcus faecalis, 
vancomycin sensitive.  Blood culture, no growth to date.



Assessment And Plan:  A 68-year-old male with:

1.   Acute rhabdomyolysis, nontraumatic, CK levels improving, still elevated.  
We will continue with IV fluids with bicarb drip.  Appreciate Nephrology input.

2.   Acute kidney injury.  His creatinine is improving secondary to 
rhabdomyolysis.  We will avoid NSAIDs.  Continue to monitor creatinine.

3.   Elevated troponin level.  No chest pain, likely secondary to demand 
mismatch.  We will follow up with Cardiology recommendations.  Echocardiogram 
does show aortic stenosis.

4.   Aortic stenosis.

5.   Morbid obesity, BMI 48.2.

6.   Acute dehydration, improving.

7.   Hypokalemia.  Replace and monitor.

8.   Hypophosphatemia.  We will replace and monitor.

9.   Moderate protein-calorie malnutrition.  Albumin is 2.1.

10.   Urinary tract infection with Enterococcus faecalis.  We will add IV 
antibiotics.  Blood cultures, negative to date.



Plan:  We will continue to monitor, likely discharge in the next 24 to 48 hours.





/MODL

DD:  08/23/2018 13:27:10   Voice ID:  [id number]

DT:  08/23/2018 16:48:29   Report ID:  [id number]

MTDD

## 2018-08-23 NOTE — PN
Date of Progress Note:  08/23/2018



Subjective:  The patient had some swelling.  The patient was admitted with rhabdomyolysis, reviewing 
the record from the PCP and his lab back in April 2018, creatinine 1 with GFR of 71.



Physical Examination:

Vital Signs:  When I saw the patient, blood pressure 139/64, pulse of 79, afebrile.  The patient had 
good urine output. 

Chest:  Clear to auscultation. 

Heart:  S1, S2.  Regular. 

Abdomen:  Soft, nontender. 

Extremities:  +1 edema.



Laboratory Data:  WBC 18.7, H and H 11.4/33.3, platelets 205.  Sodium 135, potassium 3, bicarb 28, BU
N 64, creatinine down to 2.7, GFR of 23.  Calcium 7, phos 2.2.  CK dropped down to 3000.  PTH is stil
l pending.  Renal ultrasound came normal size kidney, 13 x 16, no hydro.



Assessment And Plan:  

1.Acute kidney injury secondary to rhabdomyolysis superimposed with dehydration and prerenal superim
posed with nonsteroidal use and ARB.  On the recovery part, I can continue hydration for the patient.
  We will continue bicarb for the time being and we will follow up the patient.

2.Hypertension, controlled, optimal, given the acute kidney injury, and the route to keep holding hy
drochlorothiazide, keep holding ARB.

3.Rhabdomyolysis with acute kidney injury.  I am going to continue bicarb drip.  Continue hydration.
  Encouraged the patient for oral hydration.

4.Hypokalemia, hypomagnesemia, magnesium replenished.  I going to continue potassium supplement.





MA/ALICIA

DD:  08/23/2018 12:02:08Voice ID:  [id number]

DT:  08/23/2018 16:43:14Report ID:  [id number]

## 2018-08-24 LAB
ALBUMIN SERPL BCP-MCNC: 2.2 G/DL (ref 3.4–5)
BUN BLD-MCNC: 38 MG/DL (ref 7–18)
BUN BLD-MCNC: 47 MG/DL (ref 7–18)
GLUCOSE SERPLBLD-MCNC: 124 MG/DL (ref 74–106)
GLUCOSE SERPLBLD-MCNC: 138 MG/DL (ref 74–106)
HCT VFR BLD CALC: 29.7 % (ref 39.6–49)
LYMPHOCYTES # SPEC AUTO: 1.2 K/UL (ref 0.7–4.9)
MAGNESIUM SERPL-MCNC: 2.1 MG/DL (ref 1.8–2.4)
MCH RBC QN AUTO: 30.7 PG (ref 27–35)
MCV RBC: 88.5 FL (ref 80–100)
PMV BLD: 9.5 FL (ref 7.6–11.3)
POTASSIUM SERPL-SCNC: 3.1 MMOL/L (ref 3.5–5.1)
POTASSIUM SERPL-SCNC: 3.2 MMOL/L (ref 3.5–5.1)
RBC # BLD: 3.35 M/UL (ref 4.33–5.43)
UA COMPLETE W REFLEX CULTURE PNL UR: (no result)
UA DIPSTICK W REFLEX MICRO PNL UR: (no result)

## 2018-08-24 RX ADMIN — Medication SCH: at 08:47

## 2018-08-24 RX ADMIN — SODIUM CHLORIDE SCH MLS: 0.45 INJECTION, SOLUTION INTRAVENOUS at 01:51

## 2018-08-24 RX ADMIN — Medication SCH ML: at 08:48

## 2018-08-24 RX ADMIN — SODIUM CHLORIDE SCH: 0.45 INJECTION, SOLUTION INTRAVENOUS at 07:00

## 2018-08-24 NOTE — DS
Consultants:  Dr. Bonner with Nephrology.



Admitting Diagnoses:  

1.Acute kidney injury.

2.Acute rhabdomyolysis, nontraumatic.

3.Acute dehydration.

4.Morbid obesity.  BMI 47.

5.Leukocytosis.

6.Systolic murmur.



Discharge Diagnoses:  

1.Acute kidney injury, improving secondary to rhabdomyolysis and nonsteroidal anti-inflammatory drug
s.

2.Acute rhabdomyolysis, nontraumatic, improving.

3.Elevated troponin level secondary to demand mismatch.

4.Aortic stenosis.

5.Morbid obesity.  BMI 48.2.

6.Acute dehydration.

7.Hypokalemia.

8.Hypophosphatemia.

9.Moderate protein-calorie malnutrition.

10.Urinary tract infection secondary to Enterococcus faecalis.



Hospital Course:  The patient is a 68-year-old male comes into the hospital after PCP found abnormal 
lab work.  The patient was found to have creatinine of 5.4, elevated WBC count, CK level was 3700.  T
he patient has been on NSAIDs and working outside in the heat.  The patient was started on IV fluids.
  He did have some mild elevation of his troponin level.  Echocardiogram was done, which showed EF of
 76%.  Did have moderate aortic stenosis and left ventricular hypertrophy.  The patient will need to 
follow up with Cardiology as an outpatient for aortic stenosis and left ventricular hypertrophy.  Rep
eat troponin level trended down to 0.12.  Did not have any acute EKG changes.  His procalcitonin was 
initially elevated, white count normalized.  He was found to have Enterococcus faecalis in his urine 
culture, sensitive to vancomycin.  Unfortunately, the patient is allergic to penicillins, cannot take
 nitrofurantoin due to his creatinine clearance.  Blood cultures were negative.  The patient's kidney
 function improved and his creatinine came back down to 1.7.  Dr. Bonner with Nephrology was also c
onsulted.  The patient was on bicarb drip.  His ARB was held and he was counseled against taking any 
further NSAIDs.  The patient voiced understanding.  The patient was then able to ambulate.  Denies an
y muscular pain.  His CK level was down to 1600.  His potassium and other electrolytes were replaced.
  The patient will continue his blood pressure medication, HCTZ, but will have some potassium supplem
ent, and he is to have a repeat BMP level in 1 week and to follow up with primary care physician with
in that time frame.  Follow up with nephrologist, Dr. Bonner in 2 weeks.  Return to ER for worsenin
g condition.



Medications:  As per medication reconciliation list.  Finish a course of antibiotics for UTI.



Diet:  Renal.



Activity:  As tolerated.  No strenuous activity. 



Total time spent discharging the patient was 33 minutes.



Physical Examination:

General:  Awake, alert, oriented, no acute distress. 

CV:  S1, S2.  No murmurs. 

Respiratory:  Moving air well bilaterally.  No wheezing. 

Gastrointestinal:  Abdomen is soft, nontender, nondistended.  Positive bowel sounds. 

Extremities:  No clubbing, cyanosis.  The patient does have lower extremity edema. 

Neurologic:  Nonfocal.





SA/MODL

DD:  08/24/2018 10:59:21Voice ID:  [id number]

DT:  08/24/2018 14:36:47Report ID:  [id number]

## 2018-08-25 LAB — CARDIOLIPIN IGA SER IA-ACNC: <11 APL (ref ?–11)

## 2018-10-30 ENCOUNTER — HOSPITAL ENCOUNTER (INPATIENT)
Dept: HOSPITAL 97 - ER | Age: 68
LOS: 4 days | Discharge: HOME | DRG: 683 | End: 2018-11-03
Attending: FAMILY MEDICINE | Admitting: FAMILY MEDICINE
Payer: COMMERCIAL

## 2018-10-30 DIAGNOSIS — R94.5: ICD-10-CM

## 2018-10-30 DIAGNOSIS — Z88.2: ICD-10-CM

## 2018-10-30 DIAGNOSIS — D50.9: ICD-10-CM

## 2018-10-30 DIAGNOSIS — S43.421A: ICD-10-CM

## 2018-10-30 DIAGNOSIS — E87.6: ICD-10-CM

## 2018-10-30 DIAGNOSIS — S46.001A: ICD-10-CM

## 2018-10-30 DIAGNOSIS — N39.0: ICD-10-CM

## 2018-10-30 DIAGNOSIS — E87.1: ICD-10-CM
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
## 2018-10-30 DIAGNOSIS — Z91.81: ICD-10-CM
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
## 2018-10-30 DIAGNOSIS — N17.9: Primary | ICD-10-CM

## 2018-10-30 DIAGNOSIS — E78.5: ICD-10-CM

## 2018-10-30 DIAGNOSIS — E86.0: ICD-10-CM

## 2018-10-30 DIAGNOSIS — Z88.0: ICD-10-CM

## 2018-10-30 DIAGNOSIS — E66.01: ICD-10-CM

## 2018-10-30 DIAGNOSIS — I10: ICD-10-CM
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
## 2018-10-30 DIAGNOSIS — M62.82: ICD-10-CM

## 2018-10-30 DIAGNOSIS — K76.0: ICD-10-CM

## 2018-10-30 DIAGNOSIS — M19.90: ICD-10-CM

## 2018-10-30 DIAGNOSIS — E87.8: ICD-10-CM

## 2018-10-30 LAB
ALBUMIN SERPL BCP-MCNC: 3.3 G/DL (ref 3.4–5)
ALP SERPL-CCNC: 105 U/L (ref 45–117)
ALT SERPL W P-5'-P-CCNC: 81 U/L (ref 12–78)
AST SERPL W P-5'-P-CCNC: 310 U/L (ref 15–37)
BUN BLD-MCNC: 43 MG/DL (ref 7–18)
CKMB CREATINE KINASE MB: 14.2 NG/ML (ref 0.3–3.6)
GLUCOSE SERPLBLD-MCNC: 132 MG/DL (ref 74–106)
HCT VFR BLD CALC: 35.4 % (ref 39.6–49)
INR BLD: 1.18
LYMPHOCYTES # SPEC AUTO: 1.7 K/UL (ref 0.7–4.9)
MAGNESIUM SERPL-MCNC: 2.4 MG/DL (ref 1.8–2.4)
MCH RBC QN AUTO: 30.2 PG (ref 27–35)
MCV RBC: 89 FL (ref 80–100)
PMV BLD: 7.7 FL (ref 7.6–11.3)
POTASSIUM SERPL-SCNC: 3.4 MMOL/L (ref 3.5–5.1)
RBC # BLD: 3.98 M/UL (ref 4.33–5.43)
TROPONIN (EMERG DEPT USE ONLY): 1.33 NG/ML (ref 0–0.04)
TROPONIN I: 1.27 NG/ML (ref 0–0.04)
UA COMPLETE W REFLEX CULTURE PNL UR: (no result)
URINE COARSE GRANULAR CASTS: >10 /LPF

## 2018-10-30 PROCEDURE — 74176 CT ABD & PELVIS W/O CONTRAST: CPT

## 2018-10-30 PROCEDURE — 84100 ASSAY OF PHOSPHORUS: CPT

## 2018-10-30 PROCEDURE — 93005 ELECTROCARDIOGRAM TRACING: CPT

## 2018-10-30 PROCEDURE — 80053 COMPREHEN METABOLIC PANEL: CPT

## 2018-10-30 PROCEDURE — 85610 PROTHROMBIN TIME: CPT

## 2018-10-30 PROCEDURE — 71045 X-RAY EXAM CHEST 1 VIEW: CPT

## 2018-10-30 PROCEDURE — 82570 ASSAY OF URINE CREATININE: CPT

## 2018-10-30 PROCEDURE — 99285 EMERGENCY DEPT VISIT HI MDM: CPT

## 2018-10-30 PROCEDURE — 83540 ASSAY OF IRON: CPT

## 2018-10-30 PROCEDURE — 96365 THER/PROPH/DIAG IV INF INIT: CPT

## 2018-10-30 PROCEDURE — 70450 CT HEAD/BRAIN W/O DYE: CPT

## 2018-10-30 PROCEDURE — 94760 N-INVAS EAR/PLS OXIMETRY 1: CPT

## 2018-10-30 PROCEDURE — 87077 CULTURE AEROBIC IDENTIFY: CPT

## 2018-10-30 PROCEDURE — 96361 HYDRATE IV INFUSION ADD-ON: CPT

## 2018-10-30 PROCEDURE — 84132 ASSAY OF SERUM POTASSIUM: CPT

## 2018-10-30 PROCEDURE — 94640 AIRWAY INHALATION TREATMENT: CPT

## 2018-10-30 PROCEDURE — 87086 URINE CULTURE/COLONY COUNT: CPT

## 2018-10-30 PROCEDURE — 83880 ASSAY OF NATRIURETIC PEPTIDE: CPT

## 2018-10-30 PROCEDURE — 81015 MICROSCOPIC EXAM OF URINE: CPT

## 2018-10-30 PROCEDURE — 82728 ASSAY OF FERRITIN: CPT

## 2018-10-30 PROCEDURE — 87040 BLOOD CULTURE FOR BACTERIA: CPT

## 2018-10-30 PROCEDURE — 51702 INSERT TEMP BLADDER CATH: CPT

## 2018-10-30 PROCEDURE — 82550 ASSAY OF CK (CPK): CPT

## 2018-10-30 PROCEDURE — 96375 TX/PRO/DX INJ NEW DRUG ADDON: CPT

## 2018-10-30 PROCEDURE — 83605 ASSAY OF LACTIC ACID: CPT

## 2018-10-30 PROCEDURE — 96368 THER/DIAG CONCURRENT INF: CPT

## 2018-10-30 PROCEDURE — 84466 ASSAY OF TRANSFERRIN: CPT

## 2018-10-30 PROCEDURE — 80076 HEPATIC FUNCTION PANEL: CPT

## 2018-10-30 PROCEDURE — 76705 ECHO EXAM OF ABDOMEN: CPT

## 2018-10-30 PROCEDURE — 85025 COMPLETE CBC W/AUTO DIFF WBC: CPT

## 2018-10-30 PROCEDURE — 84145 PROCALCITONIN (PCT): CPT

## 2018-10-30 PROCEDURE — 97163 PT EVAL HIGH COMPLEX 45 MIN: CPT

## 2018-10-30 PROCEDURE — 82607 VITAMIN B-12: CPT

## 2018-10-30 PROCEDURE — 84156 ASSAY OF PROTEIN URINE: CPT

## 2018-10-30 PROCEDURE — 87088 URINE BACTERIA CULTURE: CPT

## 2018-10-30 PROCEDURE — 84484 ASSAY OF TROPONIN QUANT: CPT

## 2018-10-30 PROCEDURE — 87186 SC STD MICRODIL/AGAR DIL: CPT

## 2018-10-30 PROCEDURE — 36415 COLL VENOUS BLD VENIPUNCTURE: CPT

## 2018-10-30 PROCEDURE — 81003 URINALYSIS AUTO W/O SCOPE: CPT

## 2018-10-30 PROCEDURE — 80048 BASIC METABOLIC PNL TOTAL CA: CPT

## 2018-10-30 PROCEDURE — 83735 ASSAY OF MAGNESIUM: CPT

## 2018-10-30 PROCEDURE — 82553 CREATINE MB FRACTION: CPT

## 2018-10-30 RX ADMIN — ALBUTEROL SULFATE SCH MG: 2.5 SOLUTION RESPIRATORY (INHALATION) at 21:21

## 2018-10-30 RX ADMIN — SODIUM CHLORIDE SCH MLS: 0.9 INJECTION, SOLUTION INTRAVENOUS at 23:30

## 2018-10-30 NOTE — EKG
Test Date:    2018-10-30               Test Time:    13:29:40

Technician:   SAMAN                                   

                                                     

MEASUREMENT RESULTS:                                       

Intervals:                                           

Rate:         105                                    

CO:           168                                    

QRSD:         92                                     

QT:           344                                    

QTc:          454                                    

Axis:                                                

P:            53                                     

CO:           168                                    

QRS:          64                                     

T:            49                                     

                                                     

INTERPRETIVE STATEMENTS:                                       

                                                     

Sinus tachycardia

Otherwise normal ECG

Compared to ECG 08/21/2018 19:10:55

Sinus rhythm no longer present

Atrial premature complex(es) no longer present



Electronically Signed On 10-30-18 13:53:00 CDT by David Thurston

## 2018-10-30 NOTE — ER
Nurse's Notes                                                                                     

 [facility]                                                                

Name: Ashok Simon                                                                             

Age: 68 yrs                                                                                       

Sex: Male                                                                                         

: 1950                                                                                   

MRN: [id number]                                                                                   

Arrival Date: 10/30/2018                                                                          

Time: 12:34                                                                                       

Account#: [id number]                                                                            

Bed 25                                                                                            

Private MD:                                                                                       

Diagnosis: Other sepsis;Acute kidney failure, unspecified;Urinary tract infection, site not       

  specified                                                                                       

                                                                                                  

Presentation:                                                                                     

10/30                                                                                             

12:40 Presenting complaint: Patient states: Inability to void for 24 hours, feeling weak just jl7 

      prior to arrival, fell on  and Monday. Transition of care: patient was not            

      received from another setting of care. Onset of symptoms was 2018. Risk         

      Assessment: Do you want to hurt yourself or someone else? Patient reports no desire to      

      harm self or others. Initial Sepsis Screen: Does the patient meet any 2 criteria? No.       

      Patient's initial sepsis screen is negative. Does the patient have a suspected source       

      of infection? No. Patient's initial sepsis screen is negative. Care prior to arrival:       

      None.                                                                                       

12:40 Method Of Arrival: Ambulatory                                                           [facility] 

12:40 Acuity: CATHY 3                                                                           jl7 

                                                                                                  

Historical:                                                                                       

- Allergies:                                                                                      

12:43 PENICILLINS;                                                                            jl7 

12:43 Sulfa (Sulfonamide Antibiotics);                                                        jl7 

- PMHx:                                                                                           

12:43 Hypertension;                                                                           jl7 

- PSHx:                                                                                           

12:43 Cholecystectomy; Appendectomy;                                                          jl7 

                                                                                                  

- Immunization history:: Adult Immunizations up to date.                                          

- Social history:: Smoking status: Patient/guardian denies using tobacco.                         

- Ebola Screening: : No symptoms or risks identified at this time.                                

                                                                                                  

                                                                                                  

Screenin:12 Abuse screen: Denies threats or abuse. Denies injuries from another. Nutritional        aj1 

      screening: No deficits noted. Tuberculosis screening: No symptoms or risk factors           

      identified.                                                                                 

19:30 Fall Risk No fall in past 12 months (0 pts). Secondary diagnosis (15 points) weakness.  aj1 

      IV access (20 points). Ambulatory Aid- None/Bed Rest/Nurse Assist (0 pts). Gait- Weak       

      (10 pts.). Mental Status- Oriented to own ability (0 pts). Total Herron Fall Scale           

      indicates Low Risk Score (25-44 pts). As available Patient and Family Educated on Fall      

      Prevention Program and strategies.                                                          

                                                                                                  

Assessment:                                                                                       

13:12 General: Appears in no apparent distress. uncomfortable, Behavior is calm, cooperative, aj1 

      appropriate for age. Pain: Denies pain. Neuro: Level of Consciousness is awake, alert,      

      obeys commands, Oriented to person, place, time, situation, Reports generalized             

      weakness, falling frequently at home. Cardiovascular: Patient's skin is warm and dry.       

      Respiratory: Reports shortness of breath States that he has felt short of breath for        

      the past year and this is his normal shortness of breath Airway is patent Respiratory       

      effort is even, unlabored. GI: Abdomen is obese. : Reports inability to void, for the     

      past 24 hours. States that he has felt no urge to urinate. EENT: No signs and/or            

      symptoms were reported regarding the EENT system. Derm: No signs and/or symptoms            

      reported regarding the dermatologic system. Skin is pink, warm \T\ dry. normal.             

      Musculoskeletal: No signs and/or symptoms reported regarding the musculoskeletal            

      system. Circulation, motion, and sensation intact.                                          

14:39 Reassessment: Patient appears in no apparent distress at this time. No changes from     aj1 

      previously documented assessment. Patient and/or family updated on plan of care and         

      expected duration. Pain level reassessed. Patient is alert, oriented x 3, equal             

      unlabored respirations, skin warm/dry/pink.                                                 

15:21 Reassessment: Patient and/or family updated on plan of care and expected duration. Pain aj1 

      level reassessed. General: Appears in no apparent distress. comfortable, Behavior is        

      calm, cooperative, appropriate for age. Pain: Denies pain. Neuro: Level of                  

      Consciousness is awake, alert, obeys commands, Oriented to person, place, time,             

      situation. Cardiovascular: Patient's skin is warm and dry. Respiratory: Airway is           

      patent Respiratory effort is even, unlabored, Respiratory pattern is regular,               

      symmetrical. GI: Abdomen is obese. Derm: Skin is pink, warm \T\ dry. normal.                

      Musculoskeletal: Circulation, motion, and sensation intact.                                 

16:20 Reassessment: Patient appears in no apparent distress at this time. No changes from     aj1 

      previously documented assessment. Patient and/or family updated on plan of care and         

      expected duration. Pain level reassessed. Patient is alert, oriented x 3, equal             

      unlabored respirations, skin warm/dry/pink.                                                 

17:20 Reassessment: Patient and/or family updated on plan of care and expected duration. Pain aj1 

      level reassessed. General: Appears in no apparent distress. comfortable, Behavior is        

      calm, cooperative, appropriate for age. Pain: Denies pain. Neuro: Level of                  

      Consciousness is awake, alert, obeys commands. Cardiovascular: Patient's skin is warm       

      and dry. Respiratory: Airway is patent Respiratory effort is even, unlabored,               

      Respiratory pattern is regular, symmetrical. GI: Abdomen is obese. : Reports              

      inability to void. Derm: Skin is pink, warm \T\ dry. normal. Musculoskeletal:               

      Circulation, motion, and sensation intact.                                                  

18:20 Reassessment: Patient appears in no apparent distress at this time. No changes from     aj1 

      previously documented assessment. Patient and/or family updated on plan of care and         

      expected duration. Pain level reassessed. Patient is alert, oriented x 3, equal             

      unlabored respirations, skin warm/dry/pink.                                                 

19:29 Reassessment: Patient and/or family updated on plan of care and expected duration. Pain aj1 

      level reassessed. General: Appears in no apparent distress. comfortable, Behavior is        

      calm, cooperative, appropriate for age. Neuro: Level of Consciousness is awake, alert,      

      obeys commands, Oriented to person, place, time, situation. Cardiovascular: Patient's       

      skin is warm and dry. Respiratory: Airway is patent Respiratory effort is even,             

      unlabored, Respiratory pattern is regular, symmetrical. GI: Abdomen is obese. :.          

      Derm: Skin is pink, warm \T\ dry. normal. Musculoskeletal: Circulation, motion, and         

      sensation intact.                                                                           

                                                                                                  

Vital Signs:                                                                                      

12:43 BP 94 / 83; Pulse 103; Resp 20; Temp 99.2; Pulse Ox 98% ; Weight 150.59 kg; Height 6    jl7 

      ft. 0 in. (182.88 cm); Pain 0/10;                                                           

13:12  / 54; Pulse 106; Resp 24; Pulse Ox 98% on R/A;                                   aj1 

14:39  / 50; Pulse 105; Resp 25; Pulse Ox 95% on R/A;                                   aj1 

15:20  / 48; Pulse 99; Resp 23; Pulse Ox 95% on R/A;                                    aj1 

16:20  / 82; Pulse 100; Resp 20; Pulse Ox 99% on R/A;                                   aj1 

17:30  / 65; Pulse 104; Resp 20; Pulse Ox 95% on R/A;                                   aj1 

18:43  / 65; Pulse 91; Resp 20; Pulse Ox 95% on R/A;                                    aj1 

12:43 Body Mass Index 45.03 (150.59 kg, 182.88 cm)                                            jl7 

                                                                                                  

ED Course:                                                                                        

12:34 Patient arrived in ED.                                                                  as  

12:42 Triage completed.                                                                       jl7 

12:43 Arm band placed on right wrist.                                                         jl7 

12:49 Gregorio Ball PA is PHCP.                                                                cp  

12:50 Gregorio Zeng MD is Attending Physician.                                             cp  

12:51 Beulah Valdes, RN is Primary Nurse.                                                   aj1 

13:12 Patient has correct armband on for positive identification. Bed in low position. Call   aj1 

      light in reach. Side rails up X 1.                                                          

13:12 No provider procedures requiring assistance completed.                                  aj1 

13:33 EKG done, by EKG tech. reviewed by Gregorio WOOD.                                       at1 

14:00 Missed attempt(s): 22 gauge in left forearm. antecubital area. 24 gauge in right hand.  jp3 

      Bleeding controlled, band aid applied, catheter tip intact.                                 

14:05 Inserted saline lock: 22 gauge in right antecubital area, using aseptic technique.      aj1 

      Blood collected.                                                                            

14:13 CT completed. Patient tolerated procedure well. Patient moved to CT via stretcher.        

      Patient moved back from CT.                                                                 

14:14 CT Head Brain wo Cont In Process Unspecified.                                           EDMS

14:29 X-ray completed. Portable x-ray completed in exam room. Patient tolerated procedure     mh1 

      poorly. Patient moved back from radiology.                                                  

14:30 XRAY Chest (1 view) In Process Unspecified.                                             EDMS

14:58 Urine collected: clean catch specimen, clear, chrissy colored, Amount Voided: 120mL.      jp3 

14:59 Basic Metabolic Panel Sent.                                                             jp3 

14:59 CBC with Diff Sent.                                                                     jp3 

14:59 LFT's Sent.                                                                             jp3 

14:59 Magnesium Sent.                                                                         jp3 

14:59 Troponin (emerg Dept Use Only) Sent.                                                    jp3 

14:59 PT-INR Sent.                                                                            jp3 

14:59 NT PRO-BNP Sent.                                                                        jp3 

16:21 Sita Zhong MD is Hospitalizing Provider.                                            cp  

16:29 US Abdomen Limited: liver and gallbladder In Process Unspecified.                       EDMS

17:38 Guerrero cath inserted, using sterile technique, 12 Fr., by me, balloon inflated, to       aj1 

      gravity drainage, returned cloudy urine. Patient tolerated well.                            

19:30 Patient admitted, IV remains in place.                                                  aj1 

19:44 Report given to Susan Doss RN on 2nd floor.                                       aj1 

                                                                                                  

Administered Medications:                                                                         

15:16 Drug: NS 0.9% 500 ml Route: IV; Rate: bolus; Site: right antecubital;                   aj1 

17:41 Follow up: IV Status: Completed infusion; IV Intake: 500ml                              aj1 

16:46 Drug: LevaQUIN 500 mg Volume: 100 ml; Route: IVPB; Infused Over: 60 mins; Site: right   aj1 

      forearm;                                                                                    

17:42 Follow up: IV Status: Completed infusion; IV Intake: 100ml                              aj1 

16:46 Drug: Cefepime 2 grams Route: IVPB; Rate: 200 ml/hr; Infused Over: 30 mins; Site: right aj1 

      forearm;                                                                                    

17:42 Follow up: IV Status: Completed infusion                                                aj1 

16:47 Drug: Aspirin Chewable Tablet 324 mg Route: PO;                                         aj1 

17:43 Follow up: Response: No adverse reaction                                                aj1 

16:47 Drug: NS 0.9% 1000 ml Route: IV; Rate: 1 bolus; Site: right forearm;                    aj1 

19:28 Follow up: IV Status: Completed infusion; IV Intake: 1000ml                             aj1 

16:52 Not Given (Physician Discretion): Potassium Effervescent Tablet 25 mEq PO once;         aj1 

      dissolve in 4 ounces of water or juice                                                      

17:40 Drug: NS 0.9% 1000 ml Route: IV; Rate: 100 ml/hr; Site: right forearm;                  aj1 

19:48 Follow up: IV Status: Infusion continued upon admission                                 aj1 

18:20 Drug: fentaNYL (PF) 25 mcg Route: IVP; Site: right forearm;                             aj1 

19:28 Follow up: Response: No adverse reaction; Pain is decreased                             aj1 

19:27 Drug: NS 0.9% 500 ml Route: IV; Rate: bolus; Site: right forearm;                       aj1 

19:48 Follow up: IV Status: Infusion continued upon admission                                 aj1 

                                                                                                  

                                                                                                  

Intake:                                                                                           

17:41 IV: 500ml; Total: 500ml.                                                                aj1 

17:42 IV: 100ml; Total: 600ml.                                                                aj1 

19:28 IV: 1000ml; Total: 1600ml.                                                              aj1 

                                                                                                  

Outcome:                                                                                          

16:23 Decision to Hospitalize by Provider.                                                    cp  

19:46 Admitted to Tele accompanied by tech, via wheelchair, with chart.                       aj1 

19:46 Condition: stable                                                                           

19:46 Discharge instructions given to patient, Instructed on the need for admit, Demonstrated     

      understanding of instructions.                                                              

19:48 Patient left the ED.                                                                    aj1 

                                                                                                  

Signatures:                                                                                       

Dispatcher [employer]                           EDBeulah Hung, RN                     RN   aj1                                                  

Anais Kirkpatrick                               1                                                  

Carolyn Wagner Amelia as Gonzales, Amanda, EKG Tech              EKG Tat1                                                  

Gregorio Ball PA PA cp Leal, Jahala, RN                        RN   jl7                                                  

Vamsi Beck                              jp3                                                  

                                                                                                  

**************************************************************************************************

## 2018-10-30 NOTE — RAD REPORT
EXAM DESCRIPTION:  US - Abdomen Exam Limited - 10/30/2018 4:28 pm

 

CLINICAL HISTORY:  Abdominal pain.

 

COMPARISON:  August 2018 cat scan

 

FINDINGS:  The echotexture of the liver appears normal. Echogenic areas are visualized within the shaina
er which are ill-defined.

 

Biliary tree is not well visualized but probably is normal caliber.

.

 

IMPRESSION:  Ill-defined echogenic areas within the liver which are ill-defined. These probably repre
sent normal fat within the fissures. Less likely consideration is that these represent air. It is rec
ommended that patient have an unenhanced CT scan of the abdomen for further evaluation

## 2018-10-30 NOTE — EDPHYS
Physician Documentation                                                                           

 [facility]                                                                

Name: Ashok Simon                                                                             

Age: 68 yrs                                                                                       

Sex: Male                                                                                         

: 1950                                                                                   

MRN: [id number]                                                                                   

Arrival Date: 10/30/2018                                                                          

Time: 12:34                                                                                       

Account#: [id number]                                                                            

Bed 25                                                                                            

Private MD:                                                                                       

ED Physician Gregorio Zeng                                                                      

HPI:                                                                                              

10/30                                                                                             

13:15 This 68 yrs old  Male presents to ER via Ambulatory with complaints of Urinary cp  

      Retention, Decreased Appetite, Repeated Falls.                                              

13:15 The patient's problem is reported as weakness, that is generalized.                     cp  

13:15 Onset: The symptoms/episode began/occurred last week. Duration: The episode is          cp  

      continuous.                                                                                 

13:15 Context: occurred at home. Associated signs and symptoms: Pertinent positives:          cp  

      decreased urinary output, Pertinent negatives: abdominal pain, chest pain, headache,        

      palpitations, vomiting. Severity of symptoms: in the emergency department the symptoms      

      are unchanged despite home interventions. Patient's baseline: Neuro: alert and fully        

      oriented, Motor: no deficits, Ambulation: walks without assistance, Speech: normal.         

13:15 Patient reports multiple falls over since  after becoming weak in the legs.       cp  

                                                                                                  

Historical:                                                                                       

- Allergies:                                                                                      

12:43 PENICILLINS;                                                                            jl7 

12:43 Sulfa (Sulfonamide Antibiotics);                                                        jl7 

- PMHx:                                                                                           

12:43 Hypertension;                                                                           jl7 

- PSHx:                                                                                           

12:43 Cholecystectomy; Appendectomy;                                                          jl7 

                                                                                                  

- Immunization history:: Adult Immunizations up to date.                                          

- Social history:: Smoking status: Patient/guardian denies using tobacco.                         

- Ebola Screening: : No symptoms or risks identified at this time.                                

                                                                                                  

                                                                                                  

ROS:                                                                                              

13:20 Constitutional: Negative for body aches, chills, fever, poor PO intake.                 cp  

13:20 Eyes: Negative for injury, pain, redness, and discharge.                                cp  

13:20 ENT: Negative for drainage from ear(s), ear pain, sore throat, difficulty swallowing,       

      difficulty handling secretions.                                                             

13:20 Cardiovascular: Negative for chest pain, palpitations.                                      

13:20 Respiratory: Negative for cough, shortness of breath, wheezing.                             

13:20 Abdomen/GI: Negative for abdominal pain, nausea, vomiting, and diarrhea, anorexia,          

      black/tarry stool, rectal bleeding.                                                         

13:20 Back: Negative for pain at rest, pain with movement, radiated pain.                         

13:20 : Positive for difficulty urinating, Negative for burning with urination, testicular      

      pain                                                                                        

13:20 MS/extremity: Negative for injury or acute deformity.                                       

13:20 Skin: Negative for cellulitis, rash.                                                        

13:20 Neuro: Positive for general weakness, Negative for altered mental status, dizziness,        

      headache, loss of consciousness, syncope, near syncope.                                     

13:20 All other systems are negative.                                                             

                                                                                                  

Exam:                                                                                             

13:30 Constitutional: The patient appears in no acute distress, alert, awake,                 cp  

      non-diaphoretic, non-toxic, well developed, well nourished, obese.                          

13:30 Head/Face:  Normocephalic, atraumatic. Eyes:  Pupils equal round and reactive to light, cp  

      extra-ocular motions intact.  Lids and lashes normal.  Conjunctiva and sclera are           

      non-icteric and not injected.  Cornea within normal limits.  Periorbital areas with no      

      swelling, redness, or edema. ENT:  Nares patent. No nasal discharge, no septal              

      abnormalities noted.  Tympanic membranes are normal and external auditory canals are        

      clear.  Oropharynx with no redness, swelling, or masses, exudates, or evidence of           

      obstruction, uvula midline.  Mucous membranes moist. Neck:  Trachea midline, no             

      thyromegaly or masses palpated, and no cervical lymphadenopathy.  Supple, full range of     

      motion without nuchal rigidity, or vertebral point tenderness.  No Meningismus.             

13:30 Chest/axilla: Inspection: normal, Palpation: is normal, no crepitus, no tenderness.         

13:30 Cardiovascular: Rate: tachycardic, Rhythm: regular, Pulses: Pulses are 2+ in right          

      radial artery and left radial artery. Edema: mild bilateral lower legs, JVD: is not         

      appreciated.                                                                                

13:30 Respiratory: the patient does not display signs of respiratory distress,  Respirations:     

      normal, no use of accessory muscles, no retractions, no splinting, no tachypnea,            

      labored breathing, is not present, Breath sounds: are clear throughout, no decreased        

      breath sounds, no stridor, no wheezing.                                                     

13:30 Abdomen/GI: Inspection: obese Bowel sounds: active, all quadrants, Palpation: abdomen       

      is soft and non-tender, in all quadrants, rebound tenderness, is not appreciated,           

      voluntary guarding, is not appreciated, involuntary guarding, is not appreciated.           

13:30 Back: pain, is absent, ROM is normal.                                                       

13:30 Skin: cellulitis, is not appreciated, no rash present.                                      

13:30 Neuro: Orientation: to person, place \T\ time. Mentation: is normal, Cerebellar function:   

      is grossly normal, Motor: moves all fours, strength is normal, Sensation: no obvious        

      gross deficits.                                                                             

13:35 ECG was reviewed by the Attending Physician.                                            cp  

14:50 Radiologist reports: no acute findings                                                  cp  

                                                                                                  

Vital Signs:                                                                                      

12:43 BP 94 / 83; Pulse 103; Resp 20; Temp 99.2; Pulse Ox 98% ; Weight 150.59 kg; Height 6    jl7 

      ft. 0 in. (182.88 cm); Pain 0/10;                                                           

13:12  / 54; Pulse 106; Resp 24; Pulse Ox 98% on R/A;                                   aj1 

14:39  / 50; Pulse 105; Resp 25; Pulse Ox 95% on R/A;                                   aj1 

15:20  / 48; Pulse 99; Resp 23; Pulse Ox 95% on R/A;                                    aj1 

16:20  / 82; Pulse 100; Resp 20; Pulse Ox 99% on R/A;                                   aj1 

17:30  / 65; Pulse 104; Resp 20; Pulse Ox 95% on R/A;                                   aj1 

18:43  / 65; Pulse 91; Resp 20; Pulse Ox 95% on R/A;                                    aj1 

12:43 Body Mass Index 45.03 (150.59 kg, 182.88 cm)                                            jl7 

                                                                                                  

MDM:                                                                                              

12:50 Patient medically screened.                                                             cp  

14:00 Differential diagnosis: metabolic disorder, drug effects, cardiac arrythmia, acute MI,  cp  

      dehydration, renal failure.                                                                 

16:00 Data reviewed: vital signs, nurses notes, lab test result(s), EKG, radiologic studies,  cp  

      plain films, ultrasound, and as a result, I will admit patient.                             

16:00 Test interpretation: by ED physician or midlevel provider: ECG, plain radiologic        cp  

      studies. Response to treatment: the patient's symptoms have mildly improved after           

      treatment.                                                                                  

16:20 Physician consultation: Sita Zhong MD was called at 16:20, was contacted at 16:20,      

      regarding admission, to the telemetry unit. patient's condition.                            

                                                                                                  

10/30                                                                                             

13:13 Order name: Basic Metabolic Panel                                                       cp  

10/30                                                                                             

13:13 Order name: CBC with Diff                                                               cp  

10/30                                                                                             

13:13 Order name: LFT's                                                                       cp  

10/30                                                                                             

13:13 Order name: Magnesium                                                                   cp  

10/30                                                                                             

13:13 Order name: NT PRO-BNP                                                                  cp  

10/30                                                                                             

13:13 Order name: PT-INR                                                                      cp  

10/30                                                                                             

13:13 Order name: Troponin (emerg Dept Use Only)                                              cp  

10/30                                                                                             

13:13 Order name: Urine Microscopic Only; Complete Time: 15:32                                cp  

10/30                                                                                             

15:42 Interpretation: Normal except: UWBC >50; URBC 10-20; UBACT >50; SQEPI 10-20; AMORPH 3+. cp  

10/30                                                                                             

13:13 Order name: Blood Culture Adult (2)                                                     cp  

10/30                                                                                             

13:13 Order name: Lactate; Complete Time: 14:47                                               cp  

10/30                                                                                             

15:00 Interpretation: LAC 1.7; Reviewed.                                                      cp  

10/30                                                                                             

13:13 Order name: Procalcitonin; Complete Time: 15:47                                         cp  

10/30                                                                                             

15:47 Interpretation: Abnormal: Procalcitonin 13.73.                                            

10/30                                                                                             

13:13 Order name: Basic Metabolic Panel; Complete Time: 15:32                                 EDMS

10/30                                                                                             

15:32 Interpretation: Normal except: ; K 3.4; GLUC 132; BUN 43; CRE 3.10; GFR 20.       cp  

10/30                                                                                             

13:13 Order name: CBC with Automated Diff; Complete Time: 14:47                               EDMS

10/30                                                                                             

14:47 Interpretation: Normal except: WBC 17.6; RBC 3.98; HGB 12.0; HCT 35.4; CIARA% 79.4; LYM%  cp  

      9.9; NEUT A 13.9; MNA 1.8.                                                                  

10/30                                                                                             

13:13 Order name: Liver (Hepatic) Function; Complete Time: 15:32                              EDMS

10/30                                                                                             

15:42 Interpretation: Normal except: ; ALT 81; ALB 3.3; GLOB 4.4; A/G 0.8.             cp  

10/30                                                                                             

13:13 Order name: XRAY Chest (1 view); Complete Time: 14:47                                   cp  

10/30                                                                                             

13:13 Order name: Magnesium; Complete Time: 15:32                                             EDMS

10/30                                                                                             

13:13 Order name: NT PRO-BNP; Complete Time: 15:32                                            EDMS

10/30                                                                                             

15:47 Interpretation: Abnormal: NT PRO-BNP 3445.                                              cp  

10/30                                                                                             

13:13 Order name: Protime (+INR); Complete Time: 14:47                                        EDMS

10/30                                                                                             

15:48 Interpretation: Abnormal: PT 13.9.                                                      cp  

10/30                                                                                             

13:13 Order name: Troponin (Emerg Dept Use Only); Complete Time: 15:32                        EDMS

10/30                                                                                             

15:33 Interpretation: Abnormal: TROPED 1.33.                                                  cp  

10/30                                                                                             

13:46 Order name: CT Head Brain wo Cont; Complete Time: 14:47                                 cp  

10/30                                                                                             

14:59 Order name: Urine Dipstick--Ancillary (enter results)                                   bd  

10/30                                                                                             

15:00 Order name: Urine Dipstick-Ancillary; Complete Time: 15:32                              EDMS

10/30                                                                                             

15:48 Interpretation: Normal except: USPGR >1.030; UBLD 2+; UPROT 2+; UESTR TRACE.            cp  

10/30                                                                                             

15:25 Order name: Urine Culture                                                               EDMS

10/30                                                                                             

15:44 Order name: US Abdomen Limited: liver and gallbladder; Complete Time: 17:10             cp  

10/30                                                                                             

17:11 Interpretation: Report reviewed.                                                        cp  

10/30                                                                                             

13:13 Order name: EKG; Complete Time: 13:14                                                   cp  

10/30                                                                                             

13:13 Order name: Cardiac monitoring; Complete Time: 14:52                                    cp  

10/30                                                                                             

13:13 Order name: EKG - Nurse/Tech; Complete Time: 14:52                                      cp  

10/30                                                                                             

13:13 Order name: IV Saline Lock; Complete Time: 14:52                                        cp  

10/30                                                                                             

13:13 Order name: Labs collected and sent; Complete Time: 14:52                               cp  

10/30                                                                                             

13:13 Order name: O2 Per Protocol; Complete Time: 14:52                                       cp  

10/30                                                                                             

13:13 Order name: O2 Sat Monitoring; Complete Time: 14:52                                     cp  

10/30                                                                                             

13:13 Order name: Urine Dipstick-Ancillary (obtain specimen); Complete Time: 14:59            cp  

10/30                                                                                             

16:20 Order name: Guerrero; Complete Time: 17:43                                                 cp  

                                                                                                  

EC:35 Rate is 105 beats/min. Rhythm is regular. DE interval is normal. QRS interval is        cp  

      normal. QT interval is normal. Interpreted by me. Reviewed by me.                           

                                                                                                  

Administered Medications:                                                                         

15:16 Drug: NS 0.9% 500 ml Route: IV; Rate: bolus; Site: right antecubital;                   aj1 

17:41 Follow up: IV Status: Completed infusion; IV Intake: 500ml                              aj1 

16:46 Drug: LevaQUIN 500 mg Volume: 100 ml; Route: IVPB; Infused Over: 60 mins; Site: right   aj1 

      forearm;                                                                                    

17:42 Follow up: IV Status: Completed infusion; IV Intake: 100ml                              aj1 

16:46 Drug: Cefepime 2 grams Route: IVPB; Rate: 200 ml/hr; Infused Over: 30 mins; Site: right aj1 

      forearm;                                                                                    

17:42 Follow up: IV Status: Completed infusion                                                aj1 

16:47 Drug: Aspirin Chewable Tablet 324 mg Route: PO;                                         aj1 

17:43 Follow up: Response: No adverse reaction                                                aj1 

16:47 Drug: NS 0.9% 1000 ml Route: IV; Rate: 1 bolus; Site: right forearm;                    aj1 

19:28 Follow up: IV Status: Completed infusion; IV Intake: 1000ml                             aj1 

16:52 Not Given (Physician Discretion): Potassium Effervescent Tablet 25 mEq PO once;         aj1 

      dissolve in 4 ounces of water or juice                                                      

17:40 Drug: NS 0.9% 1000 ml Route: IV; Rate: 100 ml/hr; Site: right forearm;                  aj1 

19:48 Follow up: IV Status: Infusion continued upon admission                                 aj1 

18:20 Drug: fentaNYL (PF) 25 mcg Route: IVP; Site: right forearm;                             aj1 

19:28 Follow up: Response: No adverse reaction; Pain is decreased                             aj1 

19:27 Drug: NS 0.9% 500 ml Route: IV; Rate: bolus; Site: right forearm;                       aj1 

19:48 Follow up: IV Status: Infusion continued upon admission                                 aj1 

                                                                                                  

                                                                                                  

Disposition:                                                                                      

10/31                                                                                             

09:01 Co-signature as Attending Physician, Gregorio Zeng MD I agree with the assessment and  [facility] 

      plan of care.                                                                               

                                                                                                  

Disposition:                                                                                      

10/30/18 16:23 Hospitalization ordered by Sita Zhong for Inpatient Admission. Preliminary      

  diagnosis are Other sepsis, Acute kidney failure, unspecified, Urinary tract                    

  infection, site not specified.                                                                  

- Bed requested for Telemetry/MedSurg (Inpatient).                                                

- Status is Inpatient Admission.                                                              aj1 

- Condition is Stable.                                                                            

- Problem is new.                                                                                 

- Symptoms have improved.                                                                         

UTI on Admission? Yes                                                                             

                                                                                                  

                                                                                                  

                                                                                                  

Signatures:                                                                                       

Dispatcher [employer]                           EDMS                                                 

Beulah Valdes RN RN   [id number]                                                  

Tatiana Nova RN RN dw Anderson, Corey, MD MD cha Page, Corey, PA PA cp Leal, Jahala, RN                        RN   [id number]                                                  

                                                                                                  

Corrections: (The following items were deleted from the chart)                                    

10/30                                                                                             

14:47 14:47 Normal except: WBC 17.6; RBC 3.98; HGB 12.0; HCT 35.4; CIARA% 79.4; LYM% 9.9; NEUT  cp  

      A 13.9. cp                                                                                  

15:42 15:32 Normal except: UWBC >50; URBC 10-20; UBACT >50; SQEPI 10-20. cp                   cp  

18:22 16:23 Hospitalization Ordered by Sita Zhong MD for Inpatient Admission. Preliminary   dw  

      diagnosis is Other sepsis; Acute kidney failure, unspecified; Urinary tract infection,      

      site not specified. Bed requested for Telemetry/MedSurg (Inpatient). Status is              

      Inpatient Admission. Condition is Stable. Problem is new. Symptoms have improved. UTI       

      on Admission? Yes. cp                                                                       

19:48 18:22 10/30/2018 16:23 Hospitalization Ordered by Sita Zhong MD for Inpatient         [id number] 

      Admission. Preliminary diagnosis is Other sepsis; Acute kidney failure, unspecified;        

      Urinary tract infection, site not specified. Bed requested for Telemetry/MedSurg            

      (Inpatient). Status is Inpatient Admission. Condition is Stable. Problem is new.            

      Symptoms have improved. UTI on Admission? Yes. dw                                           

                                                                                                  

**************************************************************************************************

## 2018-10-30 NOTE — RAD REPORT
EXAM DESCRIPTION:  RAD - Chest Single View - 10/30/2018 2:30 pm

 

CLINICAL HISTORY:  general weakness

Chest pain.

 

COMPARISON:  Chest Single View dated 8/21/2018; Chest Pa And Lat (2 Views) dated 8/21/2018

 

FINDINGS:  Portable technique limits examination quality.

 

The lungs are grossly clear. The heart is normal in size. No displaced fractures.

 

IMPRESSION:  No acute intrathoracic process suspected.

## 2018-10-30 NOTE — RAD REPORT
EXAM DESCRIPTION:  CT - Head Brain Wo Cont - 10/30/2018 2:14 pm

 

CLINICAL HISTORY:  general weakness

Drowsiness, fall, head injury

 

COMPARISON:  No comparisons

 

TECHNIQUE:  All CT scans are performed using dose optimization technique as appropriate and may inclu
de automated exposure control or mA/KV adjustment according to patient size.

 

FINDINGS:  No intracranial hemorrhage, hydrocephalus or extra-axial fluid collection.No areas of brai
n edema or evidence of midline shift.

 

The paranasal sinuses and mastoids are clear. The calvarium is intact.

 

IMPRESSION:  No acute intracranial abnormality.

## 2018-10-31 LAB
ALBUMIN SERPL BCP-MCNC: 2.9 G/DL (ref 3.4–5)
ALP SERPL-CCNC: 94 U/L (ref 45–117)
ALT SERPL W P-5'-P-CCNC: 94 U/L (ref 12–78)
AST SERPL W P-5'-P-CCNC: 299 U/L (ref 15–37)
BUN BLD-MCNC: 46 MG/DL (ref 7–18)
GLUCOSE SERPLBLD-MCNC: 113 MG/DL (ref 74–106)
HCT VFR BLD CALC: 34 % (ref 39.6–49)
LYMPHOCYTES # SPEC AUTO: 2.3 K/UL (ref 0.7–4.9)
MCH RBC QN AUTO: 29.9 PG (ref 27–35)
MCV RBC: 89 FL (ref 80–100)
PMV BLD: 8 FL (ref 7.6–11.3)
POTASSIUM SERPL-SCNC: 3.4 MMOL/L (ref 3.5–5.1)
RBC # BLD: 3.82 M/UL (ref 4.33–5.43)

## 2018-10-31 RX ADMIN — ENOXAPARIN SODIUM SCH MG: 30 INJECTION SUBCUTANEOUS at 17:34

## 2018-10-31 RX ADMIN — SODIUM CHLORIDE SCH MLS: 0.9 INJECTION, SOLUTION INTRAVENOUS at 06:31

## 2018-10-31 RX ADMIN — Medication SCH GTT: at 20:03

## 2018-10-31 RX ADMIN — ALBUTEROL SULFATE SCH MG: 2.5 SOLUTION RESPIRATORY (INHALATION) at 07:45

## 2018-10-31 RX ADMIN — ALBUTEROL SULFATE SCH MG: 2.5 SOLUTION RESPIRATORY (INHALATION) at 20:17

## 2018-10-31 RX ADMIN — SODIUM CHLORIDE SCH MLS: 0.9 INJECTION, SOLUTION INTRAVENOUS at 20:04

## 2018-10-31 RX ADMIN — ALBUTEROL SULFATE SCH MG: 2.5 SOLUTION RESPIRATORY (INHALATION) at 14:08

## 2018-10-31 RX ADMIN — ALBUTEROL SULFATE SCH MG: 2.5 SOLUTION RESPIRATORY (INHALATION) at 02:22

## 2018-10-31 RX ADMIN — Medication SCH: at 09:00

## 2018-10-31 RX ADMIN — Medication SCH: at 20:03

## 2018-10-31 RX ADMIN — Medication SCH ML: at 01:16

## 2018-10-31 NOTE — P.PN
Subjective


Date of Service: 10/31/18


Chief Complaint: SHAWN





Patient seen and examined at bedside.  No family at bedside.  Chart reviewed 

and discussed with nursing staff.  Patient reports no improvement with weakness

, states he still feels off balance while trying to walk.  It is a Guerrero in, 

states that he did have increased urge but was hard for him to urinate.  

Otherwise denies any abdominal pain, nausea, vomiting, chest pain, shortness 

breath any vision changes.





Review of Systems


As noted above





Physical Examination





- Vital Signs


Temperature: 98.2 F


Blood Pressure: 133/59


Pulse: 100


Respirations: 20


Pulse Ox (%): 98





- Physical Exam


General: Alert, In no apparent distress, Obese


HEENT: Atraumatic, PERRLA, EOMI


Neck: Supple, JVD not distended


Respiratory: Clear to auscultation bilaterally, Normal air movement


Cardiovascular: Regular rate/rhythm, Normal S1 S2


Gastrointestinal: Normal bowel sounds, No tenderness


Musculoskeletal: No tenderness


Integumentary: No rashes


Neurological: Normal speech, Normal tone, Normal affect





- Studies


Medications List Reviewed: Yes





Assessment And Plan





- Plan





- Sepsis:


Met sepsis criteria on admission with:  Heart rate greater than 90, respiratory 

rate greater than 20, WBC count greater than 12,000.  He also had elevated pro 

calcitonin.  Possible source includes urine.  Continue IV empiric antibiotics 

and IV hydration.  Monitor a.m. labs


He remains afebrile.





- Rhabdomyolysis


Trend CPK


Continue IV hydration





- Acute renal injury


Improving creatinine.  Continue IV hydration and continue to monitor via a.m. 

labs.  Avoid nephrotoxic medications.





- UTI (urinary tract infection)


Urine without evidence of infection.  Start IV levofloxacin as patient has 

penicillin allergy.  Pending urine culture





- Elevated liver enzymes  


Likely secondary to decreased volume status.  Will continue to monitor.  If 

continues to stay elevated, will get abdominal ultrasound for further 

evaluation.  Did note normal alkaline


 phosphatase





- Elevated troponin


Likely secondary to decreased volumes status, acute renal failure, rhabdo.


Continue IV hydration, monitor/trend





- Status post fall/weakness


Physical therapy ordered





- Leukocytosis


Improving.  Likely secondary to urinary tract infection.  Will continue to 

monitor





DVT prophylaxis:  Lovenox.


Diet:  Regular





Disposition:  Pending symptomatic improvement.

## 2018-10-31 NOTE — P.HP
Certification for Inpatient


Patient admitted to: Observation


With expected LOS: <2 Midnights


Patient will require the following post-hospital care: None


Practitioner: I am a practitioner with admitting privileges, knowledge of 

patient current condition, hospital course, and medical plan of care.


Services: Services provided to patient in accordance with Admission 

requirements found in Title 42 Section 412.3 of the Code of Federal Regulations





Patient History


Date of Service: 10/30/18


Reason for admission: SHAWN


History of Present Illness: 





Patient is a 69yo who was admitted to the hospital decreased appetite, multiple 

falls, and patient was found to be in acute renal failure.  Patient was started 

on IV hydration.  Patient has similar episode occur a few weeks prior.  Patient 

also had a urinary tract infection as his UA appears to have increased white 

blood cell and bacteria.  Patient also has elevated AST and ALT along with 

elevated troponin and CPK and CPKMB.  Most this is probably related to patient'

s renal injury and elevation and muscle enzymes because of the falls.  Patient 

will be admitted to the hospital for IV hydration and further workup.


Allergies





Penicillins Allergy (Verified 08/21/18 21:15)


 Rash


Sulfa (Sulfonamide Antibiotics) Allergy (Verified 08/21/18 21:15)


 Rash





Home Medications: 








Brimonidine Tartrate/Timolol [Combigan 0.2%-0.5% Eye Drops] 1 gtt LEFT EYE BID 

08/21/18 


Travoprost (Benzalkonium) [Travatan 0.004% Eye Drop] 1 gtt OP BEDTIME 08/21/18 


hydroCHLOROthiazide [Hydrochlorothiazide] 1 tab PO DAILY 08/21/18 


Candesartan Cilexetil 1 tab PO DAILY 10/30/18 








- Past Medical/Surgical History


Has patient received pneumonia vaccine in the past: Yes


Diabetic: No


-: Morbid obesity


-: Hypertension


-: Glaucoma


-: Cholecystectomy


-: Appendectomy





- Family History


  ** Mother


Medical History: Hypertension


Notes: macular degenration





  ** Sister


Medical History: Cancer


Notes: breast cancer, macular degenration





  ** Father


Medical History: Lung disease, Cancer


Notes: cataract, juan antonio





- Social History


Smoking Status: Never smoker


Alcohol use: No


CD- Drugs: No


Caffeine use: Yes


Place of Residence: Home





Review of Systems


10-point ROS is otherwise unremarkable





Physical Examination





- Vital Signs


Temperature: 100 F


Blood Pressure: 121/65


Pulse: 103


Respirations: 18


Pulse Ox (%): 94





- Physical Exam


General: Alert, In no apparent distress, Oriented x3


HEENT: Atraumatic, PERRLA, Mucous membr. moist/pink, EOMI, Sclerae nonicteric


Neck: Supple, 2+ carotid pulse no bruit, No LAD, Without JVD or thyroid 

abnormality


Respiratory: Clear to auscultation bilaterally, Normal air movement


Cardiovascular: Regular rate/rhythm, Normal S1 S2, Systolic murmur


Gastrointestinal: Normal bowel sounds, Soft and benign, Non-distended, No 

tenderness


Musculoskeletal: No clubbing, No swelling, Tenderness


Integumentary: No rashes, No significant lesion, Other (Patient was bruising)


Neurological: Normal tone, Sensation intact, Cranial nerves 3-12 intact, Normal 

affect, Abnormal gait, Abnormal strength


Lymphatics: No axilla or inguinal lymphadenopathy





- Studies


Laboratory Data (last 24 hrs)





10/30/18 12:05: PT 13.9 H, INR 1.18


10/30/18 12:05: WBC 17.6 H, Hgb 12.0 L, Hct 35.4 L, Plt Count 226


10/30/18 12:05: Sodium 133 L, Potassium 3.4 L, BUN 43 H, Creatinine 3.10 H, 

Glucose 132 H, Magnesium 2.4, Total Bilirubin 0.5,  H*, ALT 81 H, 

Alkaline Phosphatase 105








Assessment & Plan





- Problems (Diagnosis)


(1) Elevated liver enzymes


Current Visit: Yes   Status: Acute   





(2) Elevated troponin


Current Visit: Yes   Status: Acute   





(3) Status post fall


Current Visit: Yes   Status: Acute   





(4) Acute renal injury


Onset Date: 08/22/18   Current Visit: No   Status: Acute   





(5) Dehydration


Onset Date: 08/22/18   Current Visit: No   Status: Acute   





(6) Leukocytosis


Onset Date: 08/22/18   Current Visit: No   Status: Acute   


Qualifiers: 


   Leukocytosis type: unspecified   Qualified Code(s): D72.829 - Elevated white 

blood cell count, unspecified   





(7) Rhabdomyolysis


Onset Date: 08/22/18   Current Visit: No   Status: Acute   


Qualifiers: 


   Rhabdomyolysis type: non-traumatic   Qualified Code(s): M62.82 - 

Rhabdomyolysis   





(8) Morbid obesity


Onset Date: 08/22/18   Current Visit: No   Status: Chronic   





(9) Elevated procalcitonin


Current Visit: Yes   Status: Acute   





(10) Elevated WBC count


Current Visit: Yes   Status: Acute   





(11) UTI (urinary tract infection)


Current Visit: Yes   Status: Acute   





- Plan





Plan:


1. IV hydration


2. Physical therapy


3. Pain control


4. Monitor muscle enzymes and liver enzymes


5. Continue with antibiotics for UTI


6. Continue monitor white blood cell count


7. Monitor renal function


8. GI and DVT prophylaxis


Discharge Plan: Home


Plan to discharge in: Greater than 2 days





- Advance Directives


Does patient have a Living Will: No


Does patient have a Durable POA for Healthcare: No





- Code Status/Comfort Care


Code Status Assessed: Yes


Code Status: Full Code


Critical Care: No


Time Spent Managing PTS Care (In Minutes): 50

## 2018-11-01 LAB
ALBUMIN SERPL BCP-MCNC: 2.7 G/DL (ref 3.4–5)
ALP SERPL-CCNC: 94 U/L (ref 45–117)
ALT SERPL W P-5'-P-CCNC: 128 U/L (ref 12–78)
AST SERPL W P-5'-P-CCNC: 329 U/L (ref 15–37)
BUN BLD-MCNC: 53 MG/DL (ref 7–18)
CKMB CREATINE KINASE MB: 9 NG/ML (ref 0.3–3.6)
GLUCOSE SERPLBLD-MCNC: 105 MG/DL (ref 74–106)
HCT VFR BLD CALC: 30.7 % (ref 39.6–49)
LYMPHOCYTES # SPEC AUTO: 2.1 K/UL (ref 0.7–4.9)
MCH RBC QN AUTO: 30.5 PG (ref 27–35)
MCV RBC: 89 FL (ref 80–100)
PMV BLD: 8.2 FL (ref 7.6–11.3)
POTASSIUM SERPL-SCNC: 3.3 MMOL/L (ref 3.5–5.1)
RBC # BLD: 3.45 M/UL (ref 4.33–5.43)
TROPONIN I: 0.34 NG/ML (ref 0–0.04)

## 2018-11-01 RX ADMIN — CARVEDILOL SCH: 3.12 TABLET, FILM COATED ORAL at 17:16

## 2018-11-01 RX ADMIN — SODIUM CHLORIDE SCH MLS: 0.9 INJECTION, SOLUTION INTRAVENOUS at 10:34

## 2018-11-01 RX ADMIN — ALBUTEROL SULFATE SCH MG: 2.5 SOLUTION RESPIRATORY (INHALATION) at 19:40

## 2018-11-01 RX ADMIN — ALBUTEROL SULFATE SCH MG: 2.5 SOLUTION RESPIRATORY (INHALATION) at 08:10

## 2018-11-01 RX ADMIN — Medication SCH: at 09:00

## 2018-11-01 RX ADMIN — Medication SCH: at 20:13

## 2018-11-01 RX ADMIN — CARVEDILOL SCH MG: 3.12 TABLET, FILM COATED ORAL at 17:13

## 2018-11-01 RX ADMIN — ALBUTEROL SULFATE SCH MG: 2.5 SOLUTION RESPIRATORY (INHALATION) at 13:12

## 2018-11-01 RX ADMIN — Medication SCH ML: at 09:00

## 2018-11-01 RX ADMIN — ENOXAPARIN SODIUM SCH MG: 30 INJECTION SUBCUTANEOUS at 10:33

## 2018-11-01 RX ADMIN — Medication SCH: at 20:12

## 2018-11-01 RX ADMIN — ALBUTEROL SULFATE SCH MG: 2.5 SOLUTION RESPIRATORY (INHALATION) at 02:55

## 2018-11-01 NOTE — P.CNS
Primary Care Provider: Dr. Raya


Chief Complaint: SHAWN


Allergies





Penicillins Allergy (Verified 08/21/18 21:15)


 Rash


Sulfa (Sulfonamide Antibiotics) Allergy (Verified 08/21/18 21:15)


 Rash





Home Medications: 








Brimonidine Tartrate/Timolol [Combigan 0.2%-0.5% Eye Drops] 1 gtt LEFT EYE BID 

08/21/18 


Travoprost (Benzalkonium) [Travatan 0.004% Eye Drop] 1 gtt OP BEDTIME 08/21/18 


hydroCHLOROthiazide [Hydrochlorothiazide] 1 tab PO DAILY 08/21/18 


Candesartan Cilexetil 1 tab PO DAILY 10/30/18 








- Past Medical/Surgical History


Diabetic: No


-: Morbid obesity


-: Hypertension


-: Glaucoma


-: Cholecystectomy


-: Appendectomy





- Family History


  ** Mother


Medical History: Hypertension


Notes: macular degenration





  ** Sister


Medical History: Cancer


Notes: breast cancer, macular degenration





  ** Father


Medical History: Lung disease, Cancer


Notes: cataract, juan antonio





- Social History


Alcohol use: No


CD- Drugs: No


Caffeine use: Yes


Place of Residence: Home





Physical Examination














Temp Pulse Resp BP Pulse Ox


 


 97.8 F   81   17   109/53 L  96 


 


 11/01/18 16:00  11/01/18 16:00  11/01/18 16:00  11/01/18 16:00  11/01/18 16:00








General: Oriented x3


HEENT: Atraumatic


Neck: Without JVD or thyroid abnormality


Respiratory: Clear to auscultation bilaterally


Cardiovascular: No edema, Normal S1 S2


Gastrointestinal: Normal bowel sounds





- Problems


(1) Elevated WBC count


Onset Date: 10/31/18   Current Visit: Yes   Status: Acute   





(2) Elevated liver enzymes


Onset Date: 10/31/18   Current Visit: Yes   Status: Acute   





(3) Elevated troponin


Onset Date: 10/31/18   Current Visit: Yes   Status: Acute   





(4) UTI (urinary tract infection)


Onset Date: 10/31/18   Current Visit: Yes   Status: Acute   





(5) Rhabdomyolysis


Onset Date: 08/22/18   Current Visit: No   Status: Acute   


Qualifiers: 


   Rhabdomyolysis type: non-traumatic   Qualified Code(s): M62.82 - 

Rhabdomyolysis   


Conclusions/Impression: 





History Of Present Illness:  


A 68-year-old man with past medical history of morbid obesity, hypertension on 

candisartan and HCTZ, hyperlipidemia, osteoarthritis. 


Pt presented after fall, was feeling weak and fell to ground 


no chest pain, paliptation, LOC or headtrauma


in ER SBP 94


found to have Cr 3.1


pt have similar presentation recently with SHAWN and peak Cr ~5.1 that improved 

on IVF and pt was dischargd with CR 1.2 


pt was taking candisartan after discharge , thought he was instructed to stop 

taking vefore discharge  

















Assessment and plan





SHAWN 


likely prerenal and rhabdomylosis 


improved on IVF 3.1>2.6>2.1 


cont IVF 


will dc rosales on Saturday 


abd CT: no hydro 


F/U UPC








UTI 


Ucx: GNR 


on levofloxacin 








Rhabdomylosis


Cont IVF








Elevated LFT 


Abd CT: fatty infiltrate 


 improving on IVF 








S/P falls


PT 





elevated CE 


Asymptomatic 


significantly improved

## 2018-11-01 NOTE — RAD REPORT
EXAM DESCRIPTION:  CT - Abdomen   Pelvis Wo Contrast - 11/1/2018 8:53 am

 

CLINICAL HISTORY:  Abdominal pain, abnormal liver function

 

COMPARISON:  CT imaging August 2018

 

TECHNIQUE:  Axial 5 mm thick CT imaging of the abdomen and pelvis was performed without IV contrast. 
No IV contrast was given because of allergy, abnormal renal function, patient refusal or physician re
quest. No oral contrast administered.

 

All CT scans are performed using dose optimization technique as appropriate and may include automated
 exposure control or mA/KV adjustment according to patient size.

 

FINDINGS:  No suspicious findings in the lung bases.

 

Liver size is normal. No nodular capsule or focal liver lesion. Attenuation is borderline fatty infil
trated. No splenomegaly or focal splenic abnormality. No pancreatic or peripancreatic acute process. 
Cholecystectomy clips are present. No biliary tree dilatation.

 

No hydronephrosis or suspicious renal mass. Nonspecific perinephric stranding is seen similar to comp
elian. This is not regarded as significant. Urinary bladder is mostly contracted. Guerrero catheter is 
in place. Presence of the catheter limits ability to assess the bladder base/trigone region. Prostate
 gland is prominent projecting into the bladder base. Pattern is not substantially different from [place]
rt interval study. No significant adrenal finding. Isodense renal masses and pyelonephritis cannot be
 excluded in the absence of IV contrast.

 

No dilated bowel loops or bowel wall thickening. No free air, free fluid or inflammatory stranding. N
o mass or bulky lymphadenopathy. Small bilateral fat filled inguinal hernias are present. There is si
gnificant atrophy of the abdominal wall musculature. Small fat only umbilical hernia is present. Disc
 and bony degenerative change present. No pathologic bone process seen.

 

 

 

 

IMPRESSION:  Borderline fatty infiltration of the liver parenchyma. No focal liver lesion identifiabl
e.

 

Cholecystectomy change with no biliary tree dilatation or pancreatic abnormality.

 

Urinary bladder is contracted limiting assessment. Guerrero catheter is in place further limiting assess
ment of the bladder base and trigone. Prostate projects into the bladder base similar to August roz
ng.

 

Full assessment is limited is the absence of IV contrast.

## 2018-11-01 NOTE — P.PN
Subjective


Date of Service: 11/01/18


Primary Care Provider: Dr. Raya


Chief Complaint: SHAWN


Subjective: Improving





Physical Examination





- Vital Signs


Temperature: 97.2 F


Blood Pressure: 112/54


Pulse: 85


Respirations: 17


Pulse Ox (%): 96





- Physical Exam


General: Alert, In no apparent distress, Oriented x3, Cooperative


HEENT: Atraumatic


Neck: Supple


Respiratory: Clear to auscultation bilaterally, Normal air movement


Cardiovascular: Normal pulses, Regular rate/rhythm


Gastrointestinal: Normal bowel sounds, Soft and benign, Non-distended, No masses

, No rebound, No guarding


Musculoskeletal: No erythema, No tenderness, No warmth


Integumentary: No tenderness/swelling, No erythema, No warmth, No cyanosis


Neurological: Normal speech, Normal strength at 5/5 x4 extr, Normal tone, 

Normal affect





- Studies


Medications List Reviewed: Yes





Assessment & Plan


Discharge Plan: Home


Plan to discharge in: 48 Hours


Physician Review Additional Text: 





Impression:


Acute renal failure secondary to rhabdomyolysis


Multiple falls


Hypokalemia


Hypertension


Anemia likely of chronic disease


Elevated liver function with borderline fatty liver


Obesity, BMI 45





Plan:


Acute renal failure secondary to rhabdomyolysis:  Continue with IV fluids.  

Nephrology has evaluated the patient and made recommendations.  Will 

discontinue hydrochlorothiazide and Arb inhibitor.  Will continue monitor 

levels closely.  Encourage ambulation.  Physical therapy to assess.


Hypokalemia:  Will monitor and replace appropriately.


Multiple falls:  Will have physical therapy assess ambulation.  Fall precaution 

in place


Hypertension:  Hydrochlorothiazide and Arb inhibitor discontinued due to acute 

renal failure.  Will monitor blood pressure without medication.  Adjustments to 

medication will be required at discharge.


Anemia likely of chronic disease:  Will check iron and B12 studies.  Will 

monitor closely.


Elevated liver function with borderline fatty liver:  Lifestyle modification 

education will be provided.


Obesity, BMI 45:  Will continue to address lifestyle modification education.


Time Spent Managing Pts Care (In Minutes): 55

## 2018-11-02 LAB
ALBUMIN SERPL BCP-MCNC: 2.6 G/DL (ref 3.4–5)
ALP SERPL-CCNC: 90 U/L (ref 45–117)
ALT SERPL W P-5'-P-CCNC: 164 U/L (ref 12–78)
AST SERPL W P-5'-P-CCNC: 271 U/L (ref 15–37)
BUN BLD-MCNC: 45 MG/DL (ref 7–18)
CREAT UR-SCNC: 49 MG/DL (ref 20–370)
FERRITIN SERPL-MCNC: 701.1 NG/ML (ref 26–388)
GLUCOSE SERPLBLD-MCNC: 105 MG/DL (ref 74–106)
HCT VFR BLD CALC: 28.3 % (ref 39.6–49)
IRON SERPL-MCNC: 49 UG/DL (ref 65–175)
LYMPHOCYTES # SPEC AUTO: 1.8 K/UL (ref 0.7–4.9)
MAGNESIUM SERPL-MCNC: 2.4 MG/DL (ref 1.8–2.4)
MCH RBC QN AUTO: 30.8 PG (ref 27–35)
MCV RBC: 89.1 FL (ref 80–100)
PMV BLD: 8.7 FL (ref 7.6–11.3)
POTASSIUM SERPL-SCNC: 4.2 MMOL/L (ref 3.5–5.1)
PROT UR-MCNC: 31 MG/DL (ref ?–11.9)
RBC # BLD: 3.18 M/UL (ref 4.33–5.43)
TRANSFERRIN SERPL-MCNC: 160 MG/DL (ref 200–360)

## 2018-11-02 RX ADMIN — IRON SUPPLEMENT SCH MG: 325 TABLET ORAL at 20:32

## 2018-11-02 RX ADMIN — Medication SCH: at 08:41

## 2018-11-02 RX ADMIN — CARVEDILOL SCH MG: 3.12 TABLET, FILM COATED ORAL at 05:09

## 2018-11-02 RX ADMIN — Medication SCH: at 20:33

## 2018-11-02 RX ADMIN — SODIUM CHLORIDE SCH MLS: 0.9 INJECTION, SOLUTION INTRAVENOUS at 21:35

## 2018-11-02 RX ADMIN — CARVEDILOL SCH: 3.12 TABLET, FILM COATED ORAL at 17:51

## 2018-11-02 RX ADMIN — ALBUTEROL SULFATE SCH MG: 2.5 SOLUTION RESPIRATORY (INHALATION) at 01:35

## 2018-11-02 RX ADMIN — SODIUM CHLORIDE SCH: 0.9 INJECTION, SOLUTION INTRAVENOUS at 17:52

## 2018-11-02 RX ADMIN — ALBUTEROL SULFATE SCH MG: 2.5 SOLUTION RESPIRATORY (INHALATION) at 07:34

## 2018-11-02 RX ADMIN — SODIUM CHLORIDE SCH MLS: 0.9 INJECTION, SOLUTION INTRAVENOUS at 00:48

## 2018-11-02 RX ADMIN — ENOXAPARIN SODIUM SCH MG: 30 INJECTION SUBCUTANEOUS at 08:41

## 2018-11-02 NOTE — P.PN
Subjective


Date of Service: 11/02/18


Primary Care Provider: Dr. Raya


Chief Complaint: SHAWN


Subjective: Improving (Patient feeling better.  Patient desires Guerrero catheter 

to be removed.  Less sore today.  Better movement of right upper extremity)





Physical Examination





- Vital Signs


Temperature: 97.3 F


Blood Pressure: 110/52


Pulse: 67


Respirations: 20


Pulse Ox (%): 97





- Physical Exam


General: Alert, In no apparent distress, Oriented x3, Cooperative


HEENT: Atraumatic


Neck: Supple


Respiratory: Clear to auscultation bilaterally, Normal air movement


Cardiovascular: Normal pulses, Regular rate/rhythm


Gastrointestinal: Normal bowel sounds, Soft and benign, Non-distended, No 

tenderness, No masses, No rebound, No guarding


Musculoskeletal: No tenderness, No warmth, Other (Less pain and better movement 

to the right upper extremity)


Integumentary: No erythema, No warmth, No cyanosis


Neurological: Normal speech, Normal strength at 5/5 x4 extr, Normal tone, 

Normal affect





- Studies


Medications List Reviewed: Yes





Assessment & Plan


Discharge Plan: Home


Plan to discharge in: 24 Hours


Physician Review Additional Text: 





Impression:


Acute renal failure secondary to rhabdomyolysis


Multiple falls


UTI


Right shoulder pain suspect rotator cuff injury


Hypokalemia


Hypertension


Anemia likely of chronic disease


Elevated liver function with borderline fatty liver


Obesity, BMI 45





Plan:


Acute renal failure secondary to rhabdomyolysis:  Continue with IV fluids.  

Patient has significantly improved.  Renal function also improved.  Medications 

adjusted.  Patient no longer taking hydrochlorothiazide and Arb inhibitor.  

Will recommend to discontinue at discharge. Will continue with ambulation.  

Monitor renal function.  Will discuss with nephrology.  Anticipate discharge 

tomorrow.  Will discontinue Guerrero catheter at the request of the patient.


Hypokalemia:  Will monitor and replace appropriately.


UTI:  Urine culture pending.  Will transition to Levaquin orally.


Right shoulder pain, suspect rotator cuff injury:  Spoke with orthopedics.  

Patient can be seen as an outpatient to further evaluate.  Continue with 

physical therapy.  No heavy lifting, pushing or pulling.


Multiple falls:  Will have physical therapy continue to assess ambulation.  

Encourage ambulation. Fall precaution in place


Hypertension:  Hydrochlorothiazide and Arb inhibitor discontinued due to acute 

renal failure.  Will recommend to discontinue both medications at discharge. 

Patient now on carvedilol 3.125 mg 1 pill twice daily.  Blood pressure stable. 


Anemia likely of chronic disease, iron deficiency noted:  Will start iron 

supplementation.  Will monitor hemoglobin.


Elevated liver function with borderline fatty liver:  Lifestyle modification 

education will be provided. 


Obesity, BMI 45:  Will continue to address lifestyle modification education.


Time Spent Managing Pts Care (In Minutes): 55

## 2018-11-02 NOTE — P.PN
Subjective


Date of Service: 11/02/18


Primary Care Provider: Dr. Raya


Chief Complaint: SHAWN





No new complaints 


Cr improved to 1.6 


Leg edema 


Dc rosales and TOV 


Hold IVF 








Physical Examination





- Vital Signs


Temperature: 97.3 F


Blood Pressure: 110/52


Pulse: 67


Respirations: 20


Pulse Ox (%): 97





- Physical Exam


General: Oriented x3


Neck: Supple, Without JVD or thyroid abnormality


Respiratory: Clear to auscultation bilaterally


Cardiovascular: Edema





- Studies


Medications List Reviewed: Yes





Assessment And Plan





- Current Problems (Diagnosis)


(1) Elevated WBC count


Onset Date: 10/31/18   Current Visit: Yes   Status: Acute   





(2) Elevated liver enzymes


Onset Date: 10/31/18   Current Visit: Yes   Status: Acute   





(3) Elevated troponin


Onset Date: 10/31/18   Current Visit: Yes   Status: Acute   





(4) UTI (urinary tract infection)


Onset Date: 10/31/18   Current Visit: Yes   Status: Acute   





(5) Rhabdomyolysis


Onset Date: 08/22/18   Current Visit: No   Status: Acute   


Qualifiers: 


   Rhabdomyolysis type: non-traumatic   Qualified Code(s): M62.82 - 

Rhabdomyolysis   





- Plan








History Of Present Illness:  


A 68-year-old man with past medical history of morbid obesity, hypertension on 

candisartan and HCTZ, hyperlipidemia, osteoarthritis. 


Pt presented after fall, was feeling weak and fell to ground 


no chest pain, paliptation, LOC or headtrauma


in ER SBP 94


found to have Cr 3.1


pt have similar presentation recently with SHAWN and peak Cr ~5.1 that improved 

on IVF and pt was dischargd with CR 1.2 


pt was taking candisartan after discharge , thought he was instructed to stop 

taking vefore discharge  

















Assessment and plan





SHAWN 


likely prerenal and rhabdomylosis 


improved on IVF 3.1>2.6>2.1 >1.6 


IVF on hold for now  


abd CT: no hydro 


UPC 0.6 








UTI 


Ucx: GNR , mixed kimi 


on levofloxacin 








Rhabdomylosis


Cont IVF





HTN


on ACE or ARBS on dischare  





Elevated LFT 


Abd CT: fatty infiltrate 


 improving on IVF 








S/P falls


PT 





elevated CE 


Asymptomatic 


significantly improved

## 2018-11-02 NOTE — RAD REPORT
EXAM DESCRIPTION:  Shoulder  Right 2 View - 11/2/2018 9:10 am

 

CLINICAL HISTORY:  Shoulder pain, possible rotator cuff injury

 

COMPARISON:  None.

 

TECHNIQUE:  Internal and external rotation views of the right shoulder were obtained.

 

FINDINGS:  There is no fracture or dislocation.  AC joint degenerative changes are present with a pro
minent inferiorly directed spur off of the clavicle. Acromial humeral joint space is normal with no a
bnormal soft tissue calcification. No acute or suspicious findings.

 

IMPRESSION:  No acute shoulder joint finding.

 

Moderately prominent bone spur from the clavicle. This may well encroach on the supraspinatus musculo
tendinous junction.

 

Concerns for rotator cuff tear can be further addressed with MR imaging.

## 2018-11-03 LAB
ALBUMIN SERPL BCP-MCNC: 2.6 G/DL (ref 3.4–5)
ALP SERPL-CCNC: 84 U/L (ref 45–117)
ALT SERPL W P-5'-P-CCNC: 148 U/L (ref 12–78)
AST SERPL W P-5'-P-CCNC: 189 U/L (ref 15–37)
BUN BLD-MCNC: 32 MG/DL (ref 7–18)
GLUCOSE SERPLBLD-MCNC: 103 MG/DL (ref 74–106)
HCT VFR BLD CALC: 28.4 % (ref 39.6–49)
LYMPHOCYTES # SPEC AUTO: 1.9 K/UL (ref 0.7–4.9)
MCH RBC QN AUTO: 30.4 PG (ref 27–35)
MCV RBC: 89.8 FL (ref 80–100)
PMV BLD: 8.4 FL (ref 7.6–11.3)
POTASSIUM SERPL-SCNC: 4 MMOL/L (ref 3.5–5.1)
RBC # BLD: 3.16 M/UL (ref 4.33–5.43)

## 2018-11-03 RX ADMIN — SODIUM CHLORIDE SCH: 0.9 INJECTION, SOLUTION INTRAVENOUS at 07:45

## 2018-11-03 RX ADMIN — Medication SCH: at 08:41

## 2018-11-03 RX ADMIN — ENOXAPARIN SODIUM SCH MG: 30 INJECTION SUBCUTANEOUS at 08:31

## 2018-11-03 RX ADMIN — IRON SUPPLEMENT SCH MG: 325 TABLET ORAL at 08:31

## 2018-11-03 RX ADMIN — CARVEDILOL SCH MG: 3.12 TABLET, FILM COATED ORAL at 05:01

## 2018-11-03 NOTE — P.DS
Admission Date: 10/30/18


Discharge Date: 11/03/18


Primary Care Provider: Dr. Raya


Disposition: ROUTINE DISCHARGE


Discharge Condition: GOOD


Reason for Admission: SHAWN


Consultations: 





Nephrology-Dr. Alba


Procedures: 





CT scan:


COMPARISON:  CT imaging August 2018 


TECHNIQUE:  Axial 5 mm thick CT imaging of the abdomen and pelvis was performed 

without IV contrast. No IV contrast was given because of allergy, abnormal 

renal function, patient refusal or physician request. No oral contrast 

administered. 


All CT scans are performed using dose optimization technique as appropriate and 

may include automated exposure control or mA/KV adjustment according to patient 

size. 


FINDINGS:  No suspicious findings in the lung bases. 


Liver size is normal. No nodular capsule or focal liver lesion. Attenuation is 

borderline fatty infiltrated. No splenomegaly or focal splenic abnormality. No 

pancreatic or peripancreatic acute process. Cholecystectomy clips are present. 

No biliary tree dilatation. 


No hydronephrosis or suspicious renal mass. Nonspecific perinephric stranding 

is seen similar to comparison. This is not regarded as significant. Urinary 

bladder is mostly contracted. Guerrero catheter is in place. Presence of the 

catheter limits ability to assess the bladder base/trigone region. Prostate 

gland is prominent projecting into the bladder base. Pattern is not 

substantially different from short interval study. No significant adrenal 

finding. Isodense renal masses and pyelonephritis cannot be excluded in the 

absence of IV contrast. 


No dilated bowel loops or bowel wall thickening. No free air, free fluid or 

inflammatory stranding. No mass or bulky lymphadenopathy. Small bilateral fat 

filled inguinal hernias are present. There is significant atrophy of the 

abdominal wall musculature. Small fat only umbilical hernia is present. Disc 

and bony degenerative change present. No pathologic bone process seen. 


IMPRESSION:  Borderline fatty infiltration of the liver parenchyma. No focal 

liver lesion identifiable. 


Cholecystectomy change with no biliary tree dilatation or pancreatic 

abnormality. 


Urinary bladder is contracted limiting assessment. Guerrero catheter is in place 

further limiting assessment of the bladder base and trigone. Prostate projects 

into the bladder base similar to August imaging.





Abdominal ultrasound:


COMPARISON:  August 2018 cat scan 


FINDINGS:  The echotexture of the liver appears normal. Echogenic areas are 

visualized within the liver which are ill-defined. 


Biliary tree is not well visualized but probably is normal caliber. 


IMPRESSION:  Ill-defined echogenic areas within the liver which are ill-

defined. These probably represent normal fat within the fissures. Less likely 

consideration is that these represent air. It is recommended that patient have 

an unenhanced CT scan of the abdomen for further evaluation





Medical problem list:


Acute renal failure secondary to rhabdomyolysis likely related to medication 

and dehydration


Multiple falls


Right shoulder pain suspect rotator cuff injury


Hypokalemia


Hypertension


Anemia likely of chronic disease


Elevated liver function with borderline fatty liver


Obesity, BMI 45





Brief History of Present Illness: 





68-year-old  male presented emergency room with decrease appetite, 

multiple falls.  Patient found to be dehydrated with acute renal failure, 

rhabdomyolysis and hyponatremia.  Patient was admitted for further evaluation.  

Patient had been hospitalized in [facility] at this past year for similar issues 

related to acute renal failure, rhabdomyolysis and dehydration.  Patient had a 

heat exhaustion at that time.


Hospital Course: 





Patient presented with acute renal failure secondary to rhabdomyolysis and 

dehydration complicated with use of diuretic therapy and Arb inhibitor for his 

hypertension.  Patient had been hospitalized in August 2018 for similar issues.

  Patient has been taking hydrochlorothiazide and Arb inhibitor-Candasartan.  

During his hospitalization both medications were discontinued.  Patient was 

given IV fluids.  Nephrology was consulted to further assess.  His symptoms 

improved with IV fluids.  His renal function now back to baseline.  At 

discharge patient will continued with good oral intake.  Recommendations for 

the patient follow up with nephrology in 1 week to monitor his progress.  

Recommendation to recheck lab-BMP in 1 week to monitor his renal function.  

Medications have been adjusted.  At discharge he will continue with carvedilol 

3.125 mg 1 pill twice daily and hydrochlorothiazide 12.5 mg 1 pill daily for 

his hypertension.  Medications will need to be adjusted in the future this can 

be done by his PCP or nephrology.  Patient is to be released back to work by 

nephrology after he is seen as an outpatient.  His Arb inhibitor-Candasartan 

has been discontinued at discharge. Recommendation on no further use of 

nonsteroidal anti-inflammatories in the future.  Future medications will need 

to be renally dosed.





Patient had electrolyte abnormalities.  This improved with replacement.  Since 

the patient will go home on hydrochlorothiazide patient will need to increase 

his potassium intake.  Recommendation to recheck lab-BMP in 1 week.  If still 

decrease patient may require potassium supplementation.  This could be further 

monitored and addressed by his PCP or nephrology.





Patient was evaluated for UTI.  Urine culture negative.  Patient was treated 

with antibiotics during his hospitalization for suspected UTI.  No need for 

antibiotics at discharge since urine culture negative. UTI prevention education 

will be provided.





Patient had right shoulder pain. Patient had multiple falls prior to admission.

  X-ray reveals possible rotator cuff injury.  Recommendation is for the 

patient to follow up with orthopedics as an outpatient to further evaluate.  

Patient will likely require MRI to further evaluate.  Patient will continue 

with physical therapy recommendations at this time.  No heavy lifting, pushing 

or pulling.





Patient has hypertension.  Medications have been adjusted during his stay.  At 

discharge Arb inhibitor-candesartan has been discontinued.  Hydrochlorothiazide 

has been decreased.  At discharge he will continue with carvedilol 3.125 mg 1 

pill twice daily and hydrochlorothiazide 12.5 mg 1 pill daily.  Recommendation 

is to maintain blood pressures less 150/80.  Further adjustment can be done by 

his PCP or nephrology.





Patient found to have iron deficiency anemia.  At discharge patient will 

continue with iron supplementation 325 mg 1 pill twice daily.  Recommendation 

is for the patient to follow up with GI as an outpatient for EGD/colonoscopy 

evaluation.





Patient with elevated liver function.  CT scan revealed borderline fatty liver.

  Education on fatty liver will be provided.  Patient may follow up with GI as 

an outpatient to further address.





Patient with obesity.  BMI 45.  Lifestyle modification education will be 

provided at discharge.


Vital Signs/Physical Exam: 














Temp Pulse Resp BP Pulse Ox


 


 96.9 F   60   20   145/66 H  99 


 


 11/03/18 04:00  11/03/18 05:01  11/03/18 04:00  11/03/18 05:01  11/03/18 04:00








General: Alert, In no apparent distress, Oriented x3, Cooperative


HEENT: Atraumatic


Neck: Supple


Respiratory: Clear to auscultation bilaterally, Normal air movement


Cardiovascular: Normal pulses, Regular rate/rhythm


Gastrointestinal: Normal bowel sounds, Soft and benign, Non-distended


Musculoskeletal: No erythema, No tenderness, No warmth


Integumentary: No tenderness/swelling, No erythema, No warmth, No cyanosis


Neurological: Normal speech, Normal strength at 5/5 x4 extr, Normal tone, 

Normal affect


Laboratory Data at Discharge: 














WBC  7.6 K/uL (4.3-10.9)   11/03/18  04:31    


 


Hgb  9.6 g/dL (13.6-17.9)  L  11/03/18  04:31    


 


Hct  28.4 % (39.6-49.0)  L  11/03/18  04:31    


 


Plt Count  249 K/uL (152-406)   11/03/18  04:31    


 


PT  13.9 SECONDS (9.5-12.5)  H  10/30/18  12:05    


 


INR  1.18   10/30/18  12:05    


 


Sodium  141 mmol/L (136-145)   11/03/18  04:31    


 


Potassium  4.0 mmol/L (3.5-5.1)   11/03/18  04:31    


 


BUN  32 mg/dL (7-18)  H  11/03/18  04:31    


 


Creatinine  1.30 mg/dL (0.55-1.3)   11/03/18  04:31    


 


Glucose  103 mg/dL ()   11/03/18  04:31    


 


Phosphorus  3.9 mg/dL (2.5-4.9)   10/31/18  06:28    


 


Magnesium  2.4 mg/dL (1.8-2.4)   11/02/18  04:53    


 


Total Bilirubin  0.4 mg/dL (0.2-1.0)   11/03/18  04:31    


 


AST  189 U/L (15-37)  H  11/03/18  04:31    


 


ALT  148 U/L (12-78)  H  11/03/18  04:31    


 


Alkaline Phosphatase  84 U/L ()   11/03/18  04:31    


 


Troponin I  0.34 ng/mL (0.0-0.045)  H  11/01/18  06:00    








Home Medications: 








Brimonidine Tartrate/Timolol [Combigan 0.2%-0.5% Eye Drops] 1 gtt LEFT EYE BID 

08/21/18 


Travoprost (Benzalkonium) [Travatan 0.004% Eye Drop] 1 gtt OP BEDTIME 08/21/18 


Carvedilol [Coreg*] 3.125 mg PO BID 6AM 6PM #60 tab 11/03/18 


Ferrous Sulfate [Ferrous Sulfate*] 325 mg PO BID #60 tab 11/03/18 


hydroCHLOROthiazide [Hydrochlorothiazide*] 12.5 mg PO DAILY #30 cap 11/03/18 





New Medications: 


Carvedilol [Coreg*] 3.125 mg PO BID 6AM 6PM #60 tab


Ferrous Sulfate [Ferrous Sulfate*] 325 mg PO BID #60 tab


hydroCHLOROthiazide [Hydrochlorothiazide*] 12.5 mg PO DAILY #30 cap


Patient Discharge Instructions: 1.  Patient will need to follow up with a PCP 

in 1 week to follow up this hospitalization.  2.  Patient presented with acute 

renal failure secondary to rhabdomyolysis and dehydration complicated with use 

of diuretic therapy and Arb inhibitor for his hypertension.  Patient had been 

hospitalized in August 2018 for similar issues. Patient was given IV fluids.  

Nephrology was consulted to further assess.  His symptoms improved with IV 

fluids.  His renal function now back to baseline.  At discharge patient will 

continued with good oral intake.  Recommendations for the patient follow up 

with nephrology in 1 week to monitor his progress.  Recommendation to recheck 

lab-BMP in 1 week to monitor his renal function.  Medications have been 

adjusted.  At discharge he will continue with carvedilol 3.125 mg 1 pill twice 

daily and hydrochlorothiazide 12.5 mg 1 pill daily for his hypertension.  

Medications will need to be adjusted in the future this can be done by his PCP 

or nephrology.  Patient is to be released back to work by nephrology after he 

is seen as an outpatient.  His Arb inhibitor-Candasartan has been discontinued 

at discharge. Recommendation on no further use of nonsteroidal anti-

inflammatories in the future.  Future medications will need to be renally 

dosed.  3.  Patient had electrolyte abnormalities.  This improved with 

replacement.  Since the patient will go home on hydrochlorothiazide patient 

will need to increase his potassium intake.  Recommendation to recheck lab-BMP 

in 1 week.  If still decrease patient may require potassium supplementation.  

This could be further monitored and addressed by his PCP or nephrology.  4.  

Patient was evaluated for UTI. Patient was treated with antibiotics during his 

hospitalization for suspected UTI.  No need for antibiotics at discharge since 

urine culture negative. UTI prevention education will be provided.  5.  Patient 

had right shoulder pain. Patient had multiple falls prior to admission.  X-ray 

reveals possible rotator cuff injury.  Recommendation is for the patient to 

follow up with orthopedics as an outpatient to further evaluate.  Patient will 

likely require MRI to further evaluate.  Patient will continue with physical 

therapy recommendations at this time.  No heavy lifting, pushing or pulling.  

6.  Patient has hypertension.  Medications have been adjusted during his stay.  

At discharge Arb inhibitor-candesartan has been discontinued.  

Hydrochlorothiazide has been decreased.  At discharge he will continue with 

carvedilol 3.125 mg 1 pill twice daily and hydrochlorothiazide 12.5 mg 1 pill 

daily.  Recommendation is to maintain blood pressures less 150/80.  Further 

adjustment can be done by his PCP or nephrology.  7.  Patient found to have 

iron deficiency anemia.  At discharge patient will continue with iron 

supplementation 325 mg 1 pill twice daily.  Recommendation is for the patient 

to follow up with GI as an outpatient for EGD/colonoscopy evaluation.  8.  

Patient with elevated liver function.  CT scan revealed borderline fatty liver.

  Education on fatty liver will be provided.  Patient may follow up with GI as 

an outpatient to further address.  9.  Patient with obesity.  BMI 45.  

Lifestyle modification education will be provided at discharge.


Diet: Renal


Activity: Fall precautions


Time spent managing pt's care (in minutes): 55

## 2020-07-07 NOTE — P.PN
Please follow-up with Dr. Fletcher emergency department for recheck in 3 days.  Return to the emergency department if he develops worsening pain, intractable vomiting, fever, chills or any symptoms concerning you.   Subjective


Date of Service: 08/22/18


Chief Complaint: Acute kidney injury


Subjective: Tolerating diet, Ambulating, Improving, Working w/ PT, Doing well





Review of Systems


General: As per HPI





Physical Examination





- Vital Signs


Temperature: 97.7 F


Blood Pressure: 109/58


Pulse: 78


Respirations: 18


Pulse Ox (%): 98





- Physical Exam


General: Alert, In no apparent distress


HEENT: Atraumatic, PERRLA, EOMI


Neck: Supple, JVD not distended


Respiratory: Clear to auscultation bilaterally, Normal air movement


Cardiovascular: Regular rate/rhythm, Normal S1 S2


Gastrointestinal: Normal bowel sounds, No tenderness


Musculoskeletal: No tenderness


Integumentary: No rashes


Neurological: Normal speech, Normal tone, Normal affect


Lymphatics: No axilla or inguinal lymphadenopathy





- Studies


Laboratory Data (last 24 hrs)





08/21/18 18:30: PT 13.5 H, INR 1.14, APTT 25.5


08/21/18 18:30: WBC 21.6 H*, Hgb 12.5 L, Hct 36.7 L, Plt Count 247


08/21/18 18:30: Sodium 133 L, Potassium 3.3 L, BUN 63 H, Creatinine 5.40 H*, 

Glucose 151 H, Magnesium 2.1, Total Bilirubin 0.4,  H, ALT 57, Alkaline 

Phosphatase 145 H, Lipase 135





Medications List Reviewed: Yes





Assessment & Plan





- Problems (Diagnosis)


(1) Rhabdomyolysis


Onset Date: 08/22/18   Current Visit: Yes   Status: Acute   


Plan: 


CPK elevated. Now improving 


-IV fluids with Nephrology consulted. 


-Will repeat lab jimi AM


Qualifiers: 


   Rhabdomyolysis type: non-traumatic   Qualified Code(s): M62.82 - 

Rhabdomyolysis   





(2) Acute renal injury


Onset Date: 08/22/18   Current Visit: Yes   Status: Acute   


Plan: 


BUN.CR elevated. Most likely 2.2 to Dehydration and Rhabdomylosis 


-IV fluids and avoid Nephrotoxic agent 











(3) Dehydration


Onset Date: 08/22/18   Current Visit: Yes   Status: Acute   





(4) Morbid obesity


Onset Date: 08/22/18   Current Visit: Yes   Status: Chronic   





(5) Systolic murmur


Onset Date: 08/22/18   Current Visit: Yes   Status: Chronic   


Plan: 


ECHO with aortic Stenosis 


-Will cardiology consulted outpt for further workup 








Discharge Plan: Home


Plan to discharge in: 48 Hours





- Code Status/Comfort Care


Code Status Assessed: Yes


Critical Care: No